# Patient Record
Sex: FEMALE | Race: BLACK OR AFRICAN AMERICAN | NOT HISPANIC OR LATINO | Employment: STUDENT | URBAN - METROPOLITAN AREA
[De-identification: names, ages, dates, MRNs, and addresses within clinical notes are randomized per-mention and may not be internally consistent; named-entity substitution may affect disease eponyms.]

---

## 2024-01-02 ENCOUNTER — TELEPHONE (OUTPATIENT)
Dept: URGENT CARE | Facility: CLINIC | Age: 24
End: 2024-01-02
Payer: COMMERCIAL

## 2024-01-02 DIAGNOSIS — N76.0 BV (BACTERIAL VAGINOSIS): Primary | ICD-10-CM

## 2024-01-02 DIAGNOSIS — B96.89 BV (BACTERIAL VAGINOSIS): Primary | ICD-10-CM

## 2024-01-02 RX ORDER — METRONIDAZOLE 500 MG/1
500 TABLET ORAL EVERY 12 HOURS
Qty: 14 TABLET | Refills: 0 | Status: SHIPPED | OUTPATIENT
Start: 2024-01-02 | End: 2024-01-09

## 2024-01-02 NOTE — TELEPHONE ENCOUNTER
Results reviewed patient is positive for BV sent over Flagyl advised on how to take and possible side effects. Patient advised not to drink alcohol with this medication we will follow up as needed

## 2024-03-18 DIAGNOSIS — S76.301A RIGHT HAMSTRING INJURY, INITIAL ENCOUNTER: ICD-10-CM

## 2024-03-18 DIAGNOSIS — S76.302A HAMSTRING INJURY, LEFT, INITIAL ENCOUNTER: Primary | ICD-10-CM

## 2024-03-27 ENCOUNTER — CLINICAL SUPPORT (OUTPATIENT)
Dept: REHABILITATION | Facility: HOSPITAL | Age: 24
End: 2024-03-27
Attending: STUDENT IN AN ORGANIZED HEALTH CARE EDUCATION/TRAINING PROGRAM
Payer: COMMERCIAL

## 2024-03-27 DIAGNOSIS — S76.301A RIGHT HAMSTRING INJURY, INITIAL ENCOUNTER: ICD-10-CM

## 2024-03-27 DIAGNOSIS — M79.605 PAIN IN BOTH LOWER EXTREMITIES: Primary | ICD-10-CM

## 2024-03-27 DIAGNOSIS — M79.604 PAIN IN BOTH LOWER EXTREMITIES: Primary | ICD-10-CM

## 2024-03-27 DIAGNOSIS — R53.1 WEAKNESS: ICD-10-CM

## 2024-03-27 DIAGNOSIS — S76.302A HAMSTRING INJURY, LEFT, INITIAL ENCOUNTER: ICD-10-CM

## 2024-03-27 PROCEDURE — 97110 THERAPEUTIC EXERCISES: CPT | Performed by: PHYSICAL THERAPIST

## 2024-03-27 PROCEDURE — 97161 PT EVAL LOW COMPLEX 20 MIN: CPT | Performed by: PHYSICAL THERAPIST

## 2024-03-27 NOTE — PROGRESS NOTES
[facility]   Physical Therapy Initial Evaluation      Name: Yahaira Darling  Clinic Number: [id number]    Therapy Diagnosis: No diagnosis found.     Physician: Mary Kate Monaco DO    Physician Orders:  eval and treat  Medical Diagnosis from Referral:   S76.302A (ICD-10-CM) - Hamstring injury, left, initial encounter   S76.301A (ICD-10-CM) - Right hamstring injury, initial encounter     Evaluation Date: 3/27/2024  Authorization Period Expiration: 3/18/24  Plan of Care Expiration: 12/31/24  Progress Note Due: 4/30/24  Visit # / Visits authorized: 1/ 1   FOTO: 1/1    Precautions: Standard     Time In: 1000  Time Out: 1100  Total Appointment Time (timed & untimed codes): 60 minutes    Subjective     Date of onset: chronic    History of current condition - Tea reports: 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 She c/o curtis posterior thigh and knee pain. She had had this pain off and on since high school and has not every full improved. Sprinter. States she is only able to run at about 70% due to pain. Denies N/T, radiating pain. Denies LBP, hip pain. Currently in season which ends in May.    Falls: none    Imaging: see chart:     Prior Therapy: with ATC at school  Social History:  lives with a friend  Occupation: student athlete  Prior Level of Function: independent, track with pain  Current Level of Function: limited ability secondary to pain    Pain:  Current 1/10, worst 8/10, best 0/10   Location: curtis hamstring L>R    Description: sharp, dull , ache  Aggravating Factors: running  Easing Factors: rest    Patients goals: run without pain     Medical History:   Past Medical History:   Diagnosis Date    ADHD (attention deficit hyperactivity disorder)     Allergy     Anxiety     Asthma     Depression     Psychiatric problem     Therapy        Surgical History:   Yahaira Darling  has a past surgical history that includes Examination under anesthesia (N/A, 8/4/2020) and Rotator cuff  repair.    Medications:   Tea has a current medication list which includes the following prescription(s): albuterol, azelastine, cetirizine, chlorhexidine, dextroamphetamine-amphetamine, dextroamphetamine-amphetamine, dextroamphetamine-amphetamine, fluticasone propionate, loratadine, mupirocin, norgestrel-ethinyl estradiol, and promethazine-dextromethorphan.    Allergies:   Review of patient's allergies indicates:  No Known Allergies     Objective      Observation: pt presents in gym. No distress noted. No bruising or deformity noted    Gait: independent, no significant deviations noted    Functional tests(*=pain):   DL squat: antalgic, non functional  SL squat: antalgic non functional  SLS EO: no LOB  SLS EC: no LOB  Eccentric step down: n/t  DL jump: n/t  SL hop: n/t    Range of Motion(*=pain):   Knee AROM PROM   Right 5-0-140 5-0-140   Left 5-0-140 5-0-140     Lower Extremity Strength: HHD lbs  Right LE  Left LE    Quadriceps: 43.0 Quadriceps: 35.2   Hamstrings:  30 deg    60 deg     90 deg   21.9 (5/10)    15.9 (2/20)    23.8 (5/10) Hamstrings:  30 deg    60 deg    90 deg   11.5 (8/10)    8.1 (7/10)    8.8 (7/10)     Special Tests:   Right Left   Valgus Stress Test N N   Varus Stress test N N   Lachman's test N N   Posterior Lachman N N   Rubina's Test N N   Thessaly's Test N N   Patellar Grind Test N N     Joint Mobility: no deviations noted     Palpation: tenderness to mid muscle belly of medial and lateral hamstring curtsi    Proximal/distal screen: full ankle ROM, mild decrease in hip IR RLE    Sensation: intact BLE    Flexibility:    Ely's test: R = night ; L = tight    Hamstrings: R = 10 deg ; L = 10 deg    Teri's test: R = n/t ; L = n/t   Nick test: R = tight ; L = tight      Limitation/Restriction for FOTO  Survey    Therapist reviewed FOTO scores for Yahaira Darling on 3/27/2024.   FOTO documents entered into [facility] - see Media section.    Limitation Score: see media         Treatment     Total  Treatment time (time-based codes) separate from Evaluation: 20 minutes      Yahaira received the treatments listed below:      therapeutic exercises to develop strength for 20 minutes including:  3 range hamstring isometrics      Patient Education and Home Exercises     Education provided:   - reviewed hep, timeline, in season management    Written Home Exercises Provided: yes. Exercises were reviewed and Yahaira was able to demonstrate them prior to the end of the session.  Yahaira demonstrated good  understanding of the education provided. See EMR under Patient Instructions for exercises provided during therapy sessions.    Assessment     Yahaira is a 23 y.o. female referred to outpatient Physical Therapy with a medical diagnosis of   S76.302A (ICD-10-CM) - Hamstring injury, left, initial encounter   S76.301A (ICD-10-CM) - Right hamstring injury, initial encounter   . Patient presents with bilateral hamstring pain/strain with acute on chronic nature, decreased force output , antalgic running, decreased functional mobility secondary to the above dx. Pt would benefit from skilled PT in order to maximize function. Informed pt that this will only resolve with rest and then reconditioning. She shows VU. Will manage in season and work in off season to decreased all symptoms and recondition the hamstrings.     Patient prognosis is Good.   Patient will benefit from skilled outpatient Physical Therapy to address the deficits stated above and in the chart below, provide patient /family education, and to maximize patientt's level of independence.     Plan of care discussed with patient: Yes  Patient's spiritual, cultural and educational needs considered and patient is agreeable to the plan of care and goals as stated below:     Anticipated Barriers for therapy: in season    Medical Necessity is demonstrated by the following  History  Co-morbidities and personal factors that may impact the plan of care [x] LOW: no personal factors /  co-morbidities  [] MODERATE: 1-2 personal factors / co-morbidities  [] HIGH: 3+ personal factors / co-morbidities     Moderate / High Support Documentation:   Co-morbidities affecting plan of care: NA     Personal Factors:   no deficits      Examination  Body Structures and Functions, activity limitations and participation restrictions that may impact the plan of care [x] LOW: addressing 1-2 elements  [] MODERATE: 3+ elements  [] HIGH: 4+ elements (please support below)     Moderate / High Support Documentation: NA      Clinical Presentation [x] LOW: stable  [] MODERATE: Evolving  [] HIGH: Unstable      Decision Making/ Complexity Score: low         Goals:  Short Term Goals: 6-8 weeks   Pt independent in hep  Pain 0-5/10, pt able to managed with help of ATC  Increased torque output by 50% of current  Pt completes season    Long Term Goals: 16-30 weeks   Pt independent in d/c hep  0/10 pain  Isometric torque 100 % LSI and in standard for her bodyweight  Isokinetic testing 100% LSI and in standard for her bodyweight  Pt progresses through strength and conditioning program  Pt returns to jogging and progresses through return to sprinting program.    Plan     Plan of care Certification: 3/27/2024 to 12/31/24.    Outpatient Physical Therapy 1-3 times weekly for 30 weeks to include the following interventions: Electrical Stimulation IFC, Manual Therapy, Moist Heat/ Ice, Neuromuscular Re-ed, Patient Education, Therapeutic Activities, and Therapeutic Exercise.     Adelso Hdez, PT

## 2024-03-29 NOTE — PLAN OF CARE
[facility]   Physical Therapy Initial Evaluation      Name: Yahaira Darling  Clinic Number: [id number]    Therapy Diagnosis: No diagnosis found.     Physician: Mary Kate Monaco DO    Physician Orders:  eval and treat  Medical Diagnosis from Referral:   S76.302A (ICD-10-CM) - Hamstring injury, left, initial encounter   S76.301A (ICD-10-CM) - Right hamstring injury, initial encounter     Evaluation Date: 3/27/2024  Authorization Period Expiration: 3/18/24  Plan of Care Expiration: 12/31/24  Progress Note Due: 4/30/24  Visit # / Visits authorized: 1/ 1   FOTO: 1/1    Precautions: Standard     Time In: 1000  Time Out: 1100  Total Appointment Time (timed & untimed codes): 60 minutes    Subjective     Date of onset: chronic    History of current condition - Tea reports: 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 She had had this pain off and on since high school and has not every full improved. Sprinter. States she is only able to run at about 70% due to pain. Denies N/T, radiating pain. Denies LBP, hip pain. Currently in season which ends in May.    Falls: none    Imaging: see chart:     Prior Therapy: with ATC at school  Social History:  lives with a friend  Occupation: student athlete  Prior Level of Function: independent, track with pain  Current Level of Function: limited ability secondary to pain    Pain:  Current 1/10, worst 8/10, best 0/10   Location: curtis hamstring L>R    Description: sharp, dull , ache  Aggravating Factors: running  Easing Factors: rest    Patients goals: run without pain     Medical History:   Past Medical History:   Diagnosis Date    ADHD (attention deficit hyperactivity disorder)     Allergy     Anxiety     Asthma     Depression     Psychiatric problem     Therapy        Surgical History:   Yahaira Darling  has a past surgical history that includes Examination under anesthesia (N/A, 8/4/2020) and Rotator cuff  repair.    Medications:   Tea has a current medication list which includes the following prescription(s): albuterol, azelastine, cetirizine, chlorhexidine, dextroamphetamine-amphetamine, dextroamphetamine-amphetamine, dextroamphetamine-amphetamine, fluticasone propionate, loratadine, mupirocin, norgestrel-ethinyl estradiol, and promethazine-dextromethorphan.    Allergies:   Review of patient's allergies indicates:  No Known Allergies     Objective      Observation: pt presents in gym. No distress noted. No bruising or deformity noted    Gait: independent, no significant deviations noted    Functional tests(*=pain):   DL squat: antalgic, non functional  SL squat: antalgic non functional  SLS EO: no LOB  SLS EC: no LOB  Eccentric step down: n/t  DL jump: n/t  SL hop: n/t    Range of Motion(*=pain):   Knee AROM PROM   Right 5-0-140 5-0-140   Left 5-0-140 5-0-140     Lower Extremity Strength: HHD lbs  Right LE  Left LE    Quadriceps: 43.0 Quadriceps: 35.2   Hamstrings:  30 deg    60 deg     90 deg   21.9 (5/10)    15.9 (2/20)    23.8 (5/10) Hamstrings:  30 deg    60 deg    90 deg   11.5 (8/10)    8.1 (7/10)    8.8 (7/10)     Special Tests:   Right Left   Valgus Stress Test N N   Varus Stress test N N   Lachman's test N N   Posterior Lachman N N   Rubina's Test N N   Thessaly's Test N N   Patellar Grind Test N N     Joint Mobility: no deviations noted     Palpation: tenderness to mid muscle belly of medial and lateral hamstring curtis    Proximal/distal screen: full ankle ROM, mild decrease in hip IR RLE    Sensation: intact BLE    Flexibility:    Ely's test: R = night ; L = tight    Hamstrings: R = 10 deg ; L = 10 deg    Teri's test: R = n/t ; L = n/t   Nick test: R = tight ; L = tight      Limitation/Restriction for FOTO  Survey    Therapist reviewed FOTO scores for Yahaira Darling on 3/27/2024.   FOTO documents entered into [facility] - see Media section.    Limitation Score: see media         Treatment     Total  Treatment time (time-based codes) separate from Evaluation: 20 minutes      Yahaira received the treatments listed below:      therapeutic exercises to develop strength for 20 minutes including:  3 range hamstring isometrics      Patient Education and Home Exercises     Education provided:   - reviewed hep, timeline, in season management    Written Home Exercises Provided: yes. Exercises were reviewed and Yahaira was able to demonstrate them prior to the end of the session.  Yahaira demonstrated good  understanding of the education provided. See EMR under Patient Instructions for exercises provided during therapy sessions.    Assessment     Yahaira is a 23 y.o. female referred to outpatient Physical Therapy with a medical diagnosis of   S76.302A (ICD-10-CM) - Hamstring injury, left, initial encounter   S76.301A (ICD-10-CM) - Right hamstring injury, initial encounter   . Patient presents with bilateral hamstring pain/strain with acute on chronic nature, decreased force output , antalgic running, decreased functional mobility secondary to the above dx. Pt would benefit from skilled PT in order to maximize function. Informed pt that this will only resolve with rest and then reconditioning. She shows VU. Will manage in season and work in off season to decreased all symptoms and recondition the hamstrings.     Patient prognosis is Good.   Patient will benefit from skilled outpatient Physical Therapy to address the deficits stated above and in the chart below, provide patient /family education, and to maximize patientt's level of independence.     Plan of care discussed with patient: Yes  Patient's spiritual, cultural and educational needs considered and patient is agreeable to the plan of care and goals as stated below:     Anticipated Barriers for therapy: in season    Medical Necessity is demonstrated by the following  History  Co-morbidities and personal factors that may impact the plan of care [x] LOW: no personal factors /  co-morbidities  [] MODERATE: 1-2 personal factors / co-morbidities  [] HIGH: 3+ personal factors / co-morbidities     Moderate / High Support Documentation:   Co-morbidities affecting plan of care: NA     Personal Factors:   no deficits      Examination  Body Structures and Functions, activity limitations and participation restrictions that may impact the plan of care [x] LOW: addressing 1-2 elements  [] MODERATE: 3+ elements  [] HIGH: 4+ elements (please support below)     Moderate / High Support Documentation: NA      Clinical Presentation [x] LOW: stable  [] MODERATE: Evolving  [] HIGH: Unstable      Decision Making/ Complexity Score: low         Goals:  Short Term Goals: 6-8 weeks   Pt independent in hep  Pain 0-5/10, pt able to managed with help of ATC  Increased torque output by 50% of current  Pt completes season    Long Term Goals: 16-30 weeks   Pt independent in d/c hep  0/10 pain  Isometric torque 100 % LSI and in standard for her bodyweight  Isokinetic testing 100% LSI and in standard for her bodyweight  Pt progresses through strength and conditioning program  Pt returns to jogging and progresses through return to sprinting program.    Plan     Plan of care Certification: 3/27/2024 to 12/31/24.    Outpatient Physical Therapy 1-3 times weekly for 30 weeks to include the following interventions: Electrical Stimulation IFC, Manual Therapy, Moist Heat/ Ice, Neuromuscular Re-ed, Patient Education, Therapeutic Activities, and Therapeutic Exercise.     Adelso Hdez, PT

## 2024-05-31 ENCOUNTER — HOSPITAL ENCOUNTER (EMERGENCY)
Facility: HOSPITAL | Age: 24
Discharge: LEFT BEFORE DISCHARGE | End: 2024-05-31
Attending: EMERGENCY MEDICINE
Payer: COMMERCIAL

## 2024-05-31 VITALS
WEIGHT: 145 LBS | HEART RATE: 60 BPM | SYSTOLIC BLOOD PRESSURE: 109 MMHG | HEIGHT: 65 IN | RESPIRATION RATE: 16 BRPM | OXYGEN SATURATION: 100 % | TEMPERATURE: 99 F | BODY MASS INDEX: 24.16 KG/M2 | DIASTOLIC BLOOD PRESSURE: 53 MMHG

## 2024-05-31 DIAGNOSIS — M25.512 LEFT SHOULDER PAIN: ICD-10-CM

## 2024-05-31 DIAGNOSIS — Z53.21 ELOPED FROM EMERGENCY DEPARTMENT: Primary | ICD-10-CM

## 2024-05-31 LAB
B-HCG UR QL: NEGATIVE
CTP QC/QA: YES

## 2024-05-31 PROCEDURE — 25000003 PHARM REV CODE 250: Performed by: PHYSICIAN ASSISTANT

## 2024-05-31 PROCEDURE — 81025 URINE PREGNANCY TEST: CPT

## 2024-05-31 PROCEDURE — 96372 THER/PROPH/DIAG INJ SC/IM: CPT | Performed by: PHYSICIAN ASSISTANT

## 2024-05-31 PROCEDURE — 99284 EMERGENCY DEPT VISIT MOD MDM: CPT | Mod: 25

## 2024-05-31 PROCEDURE — 63600175 PHARM REV CODE 636 W HCPCS: Performed by: PHYSICIAN ASSISTANT

## 2024-05-31 RX ORDER — KETOROLAC TROMETHAMINE 30 MG/ML
30 INJECTION, SOLUTION INTRAMUSCULAR; INTRAVENOUS
Status: COMPLETED | OUTPATIENT
Start: 2024-05-31 | End: 2024-05-31

## 2024-05-31 RX ORDER — ACETAMINOPHEN 500 MG
1000 TABLET ORAL
Status: COMPLETED | OUTPATIENT
Start: 2024-05-31 | End: 2024-05-31

## 2024-05-31 RX ADMIN — KETOROLAC TROMETHAMINE 30 MG: 30 INJECTION, SOLUTION INTRAMUSCULAR at 06:05

## 2024-05-31 RX ADMIN — ACETAMINOPHEN 1000 MG: 500 TABLET ORAL at 06:05

## 2024-05-31 NOTE — ED PROVIDER NOTES
"Encounter Date: 5/31/2024    SCRIBE #1 NOTE: I, Tenisha Rodriguez, am scribing for, and in the presence of,  Alayna Holdsworth, PA-C. I have scribed the following portions of the note - Other sections scribed: HPI, ROS.       History     Chief Complaint   Patient presents with    Shoulder Pain     Pt reports sleeping on her LEFT shoulder, woke from sleep with it feeling like it was dislocated this morning, and that it popped back into place in route to ED; pt has hx of LEFT shoulder dislocation in 2022; pt still c/o pain to left shoulder "soreness"     23-year-old female with no significant past medical history & and past surgical history of Rotator Cuff Repair, presents to the ED with Left Shoulder Pain, symptoms onset this morning. Patient reports sore, throbbing 7/10 shoulder pain. Patient states she woke from her sleep with her left shoulder feeling like it was dislocated and that en route to the hospital it felt like it popped back into place; has dislocated her shoulder at least three times in the past. Patient also states that she has had surgery on the right shoulder. Patient further states movement and touch worsens her pain. Patient denies numbness/paresthesia, swelling, or other associated symptoms. Patient did not attempt treatment/medication PTA. No other alleviating or exacerbating factors. This is the extent of the patient's complaints in the ED. NKDA.                         The history is provided by the patient. No  was used.     Review of patient's allergies indicates:  No Known Allergies  Past Medical History:   Diagnosis Date    ADHD (attention deficit hyperactivity disorder)     Allergy     Anxiety     Asthma     Depression     Psychiatric problem     Therapy      Past Surgical History:   Procedure Laterality Date    EXAMINATION UNDER ANESTHESIA N/A 8/4/2020    Procedure: EXAM UNDER ANESTHESIA;  Surgeon: Delfina Surgeon;  [facility];  Service: Anesthesiology;  Laterality: " N/A;    ROTATOR CUFF REPAIR       Family History   Problem Relation Name Age of Onset    Hypertension Mother      ADD / ADHD Sister      Hypertension Maternal Grandmother      Drug abuse Maternal Grandmother      Hypertension Maternal Grandfather      Drug abuse Maternal Grandfather      Hypertension Paternal Grandmother      Drug abuse Paternal Grandmother      Hypertension Paternal Grandfather      Drug abuse Paternal Grandfather      Breast cancer Neg Hx      Colon cancer Neg Hx      Ovarian cancer Neg Hx       Social History     Tobacco Use    Smoking status: Never    Smokeless tobacco: Never   Substance Use Topics    Alcohol use: Yes     Comment: socially    Drug use: Never     Review of Systems   Constitutional:  Negative for fever.   Musculoskeletal:  Positive for arthralgias (left shoulder). Negative for joint swelling.   Skin:  Negative for color change and rash.   Neurological:  Negative for weakness and numbness.        (-) paresthesia        Physical Exam     Initial Vitals [05/31/24 0557]   BP Pulse Resp Temp SpO2   (!) 109/53 60 16 98.7 °F (37.1 °C) 100 %      MAP       --         Physical Exam    Nursing note and vitals reviewed.  Constitutional: She appears well-developed and well-nourished. She is not diaphoretic. She is active. She does not appear ill. No distress.   HENT:   Head: Normocephalic and atraumatic.   Right Ear: External ear normal.   Left Ear: External ear normal.   Nose: Nose normal.   Eyes: Conjunctivae, EOM and lids are normal. Pupils are equal, round, and reactive to light. Right eye exhibits no discharge. Left eye exhibits no discharge.   Neck: Phonation normal. Neck supple.   Normal range of motion.   Full passive range of motion without pain.     Cardiovascular:            Pulses:       Radial pulses are 2+ on the left side.   Pulmonary/Chest: Effort normal. No respiratory distress.   Abdominal: She exhibits no distension.   Musculoskeletal:         General: Normal range of  motion.      Left shoulder: Tenderness (anterior lateral) present. No swelling, deformity, effusion, laceration or crepitus. Normal range of motion. Normal strength.      Left upper arm: Normal.      Cervical back: Full passive range of motion without pain, normal range of motion and neck supple.      Comments: Normal range of motion of the left shoulder with pain.  2+ radial pulse.  Normal sensation.  No deformities appreciated.  No erythema, warmth, swelling, or pallor appreciated.     Neurological: She is alert and oriented to person, place, and time. She has normal strength. No sensory deficit. GCS eye subscore is 4. GCS verbal subscore is 5. GCS motor subscore is 6.   Skin: Skin is dry. Capillary refill takes less than 2 seconds.         ED Course   Procedures  Labs Reviewed   POCT URINE PREGNANCY          Imaging Results              X-Ray Shoulder Trauma Left (In process)                      Medications   ketorolac injection 30 mg (30 mg Intramuscular Given 5/31/24 0612)   acetaminophen tablet 1,000 mg (1,000 mg Oral Given 5/31/24 0612)     Medical Decision Making  23-year-old female with no significant past medical history & and past surgical history of Rotator Cuff Repair, presents to the ED with Left Shoulder Pain, symptoms onset this morning.  Patient's chart and medical history reviewed.    Ddx:  Fracture  Dislocation  Contusion  Sprain    Patient's vitals reviewed.  Afebrile, no respiratory distress, and nontoxic-appearing in the ED. Normal range of motion of the left shoulder with pain.  2+ radial pulse.  Normal sensation.  No deformities appreciated.  No erythema, warmth, swelling, or pallor appreciated.  UPT was negative.  Patient given Toradol and Tylenol pain.  Left shoulder x-ray was unremarkable per my personal interpretation.  Patient states she no longer wants to wait for x-ray results.  Patient eloped from ED before signing AMA.    Amount and/or Complexity of Data Reviewed  Labs:  ordered.            Scribe Attestation:   Scribe #1: I performed the above scribed service and the documentation accurately describes the services I performed. I attest to the accuracy of the note.           I, Alayna Holdsworth,PA-C, personally performed the services described in this documentation. All medical record entries made by the scribe were at my direction and in my presence.  I have reviewed the chart and agree that the record reflects my personal performance and is accurate and complete.                      Clinical Impression:  Final diagnoses:  [M25.512] Left shoulder pain  [M25.512] Left shoulder pain - recent dislocation--spontaneous reduction  [Z53.21] Eloped from emergency department (Primary)          ED Disposition Condition    Eloped Stable                Holdsworth, Alayna, PA-C  05/31/24 0807

## 2024-05-31 NOTE — ED NOTES
"Pt wanting to leave AMA stating that "she is ready to go, been waiting til 6am". Pt does not want to wait for xray results. Pt refused to sign AMA form. DEANNA Arshad notified.   "

## 2024-05-31 NOTE — ED TRIAGE NOTES
"Pt arrives to ED via personal transportation with reports that she was sleeping on her LEFT shoulder, woke from sleep with it feeling like it was dislocated this morning, and that it popped back into place in route to ED; pt has hx of LEFT shoulder dislocation in 2022; pt still c/o pain to left shoulder "soreness" 7/10; no meds taken for pain; no obvious deformity noted; pt AAOx4  "

## 2024-06-01 ENCOUNTER — HOSPITAL ENCOUNTER (EMERGENCY)
Facility: HOSPITAL | Age: 24
Discharge: HOME | End: 2024-06-02
Attending: EMERGENCY MEDICINE
Payer: COMMERCIAL

## 2024-06-01 DIAGNOSIS — M25.519 SHOULDER PAIN, UNSPECIFIED CHRONICITY, UNSPECIFIED LATERALITY: ICD-10-CM

## 2024-06-01 DIAGNOSIS — R11.2 NAUSEA AND VOMITING, UNSPECIFIED VOMITING TYPE: Primary | ICD-10-CM

## 2024-06-01 DIAGNOSIS — Z76.0 MEDICATION REFILL: ICD-10-CM

## 2024-06-01 DIAGNOSIS — G47.00 INSOMNIA, UNSPECIFIED TYPE: ICD-10-CM

## 2024-06-01 LAB
B-HCG UR QL: NEGATIVE
BASOPHILS # BLD AUTO: 0.01 K/UL (ref 0–0.2)
BASOPHILS NFR BLD: 0.2 % (ref 0–1.9)
CTP QC/QA: YES
DIFFERENTIAL METHOD BLD: ABNORMAL
EOSINOPHIL # BLD AUTO: 0.1 K/UL (ref 0–0.5)
EOSINOPHIL NFR BLD: 1.7 % (ref 0–8)
ERYTHROCYTE [DISTWIDTH] IN BLOOD BY AUTOMATED COUNT: 12.6 % (ref 11.5–14.5)
HCT VFR BLD AUTO: 40.4 % (ref 37–48.5)
HGB BLD-MCNC: 13.8 G/DL (ref 12–16)
IMM GRANULOCYTES # BLD AUTO: 0.01 K/UL (ref 0–0.04)
IMM GRANULOCYTES NFR BLD AUTO: 0.2 % (ref 0–0.5)
LYMPHOCYTES # BLD AUTO: 1.7 K/UL (ref 1–4.8)
LYMPHOCYTES NFR BLD: 28.9 % (ref 18–48)
MCH RBC QN AUTO: 31.4 PG (ref 27–31)
MCHC RBC AUTO-ENTMCNC: 34.2 G/DL (ref 32–36)
MCV RBC AUTO: 92 FL (ref 82–98)
MONOCYTES # BLD AUTO: 0.4 K/UL (ref 0.3–1)
MONOCYTES NFR BLD: 6.3 % (ref 4–15)
NEUTROPHILS # BLD AUTO: 3.8 K/UL (ref 1.8–7.7)
NEUTROPHILS NFR BLD: 62.7 % (ref 38–73)
NRBC BLD-RTO: 0 /100 WBC
PLATELET # BLD AUTO: 193 K/UL (ref 150–450)
PMV BLD AUTO: 11.3 FL (ref 9.2–12.9)
RBC # BLD AUTO: 4.39 M/UL (ref 4–5.4)
WBC # BLD AUTO: 5.99 K/UL (ref 3.9–12.7)

## 2024-06-01 PROCEDURE — 81025 URINE PREGNANCY TEST: CPT | Performed by: EMERGENCY MEDICINE

## 2024-06-01 PROCEDURE — 83690 ASSAY OF LIPASE: CPT | Performed by: EMERGENCY MEDICINE

## 2024-06-01 PROCEDURE — 81000 URINALYSIS NONAUTO W/SCOPE: CPT | Mod: 59 | Performed by: EMERGENCY MEDICINE

## 2024-06-01 PROCEDURE — 99283 EMERGENCY DEPT VISIT LOW MDM: CPT

## 2024-06-01 PROCEDURE — 80307 DRUG TEST PRSMV CHEM ANLYZR: CPT | Performed by: EMERGENCY MEDICINE

## 2024-06-01 PROCEDURE — 80053 COMPREHEN METABOLIC PANEL: CPT | Performed by: EMERGENCY MEDICINE

## 2024-06-01 PROCEDURE — 87591 N.GONORRHOEAE DNA AMP PROB: CPT | Performed by: PHYSICIAN ASSISTANT

## 2024-06-01 PROCEDURE — 85025 COMPLETE CBC W/AUTO DIFF WBC: CPT | Performed by: EMERGENCY MEDICINE

## 2024-06-01 PROCEDURE — 87086 URINE CULTURE/COLONY COUNT: CPT | Performed by: PHYSICIAN ASSISTANT

## 2024-06-01 PROCEDURE — 25000003 PHARM REV CODE 250: Performed by: EMERGENCY MEDICINE

## 2024-06-01 RX ORDER — ONDANSETRON 4 MG/1
4 TABLET, ORALLY DISINTEGRATING ORAL
Status: COMPLETED | OUTPATIENT
Start: 2024-06-01 | End: 2024-06-01

## 2024-06-01 RX ADMIN — ONDANSETRON 4 MG: 4 TABLET, ORALLY DISINTEGRATING ORAL at 11:06

## 2024-06-02 VITALS
WEIGHT: 145 LBS | DIASTOLIC BLOOD PRESSURE: 74 MMHG | BODY MASS INDEX: 24.16 KG/M2 | HEART RATE: 60 BPM | TEMPERATURE: 99 F | RESPIRATION RATE: 18 BRPM | HEIGHT: 65 IN | SYSTOLIC BLOOD PRESSURE: 104 MMHG | OXYGEN SATURATION: 99 %

## 2024-06-02 LAB
ALBUMIN SERPL BCP-MCNC: 4.4 G/DL (ref 3.5–5.2)
ALP SERPL-CCNC: 45 U/L (ref 55–135)
ALT SERPL W/O P-5'-P-CCNC: 8 U/L (ref 10–44)
AMPHET+METHAMPHET UR QL: NEGATIVE
ANION GAP SERPL CALC-SCNC: 12 MMOL/L (ref 8–16)
AST SERPL-CCNC: 25 U/L (ref 10–40)
BACTERIA #/AREA URNS HPF: ABNORMAL /HPF
BARBITURATES UR QL SCN>200 NG/ML: NEGATIVE
BENZODIAZ UR QL SCN>200 NG/ML: NEGATIVE
BILIRUB SERPL-MCNC: 1.2 MG/DL (ref 0.1–1)
BILIRUB UR QL STRIP: NEGATIVE
BUN SERPL-MCNC: 13 MG/DL (ref 6–20)
BZE UR QL SCN: NEGATIVE
CALCIUM SERPL-MCNC: 9.6 MG/DL (ref 8.7–10.5)
CANNABINOIDS UR QL SCN: ABNORMAL
CHLORIDE SERPL-SCNC: 104 MMOL/L (ref 95–110)
CLARITY UR: CLEAR
CO2 SERPL-SCNC: 22 MMOL/L (ref 23–29)
COLOR UR: YELLOW
CREAT SERPL-MCNC: 1.2 MG/DL (ref 0.5–1.4)
CREAT UR-MCNC: 163.4 MG/DL (ref 15–325)
EST. GFR  (NO RACE VARIABLE): >60 ML/MIN/1.73 M^2
GLUCOSE SERPL-MCNC: 85 MG/DL (ref 70–110)
GLUCOSE UR QL STRIP: NEGATIVE
HGB UR QL STRIP: ABNORMAL
KETONES UR QL STRIP: ABNORMAL
LEUKOCYTE ESTERASE UR QL STRIP: ABNORMAL
LIPASE SERPL-CCNC: 23 U/L (ref 4–60)
METHADONE UR QL SCN>300 NG/ML: NEGATIVE
MICROSCOPIC COMMENT: ABNORMAL
NITRITE UR QL STRIP: NEGATIVE
OPIATES UR QL SCN: NEGATIVE
PCP UR QL SCN>25 NG/ML: NEGATIVE
PH UR STRIP: 6 [PH] (ref 5–8)
POTASSIUM SERPL-SCNC: 4.1 MMOL/L (ref 3.5–5.1)
PROT SERPL-MCNC: 8 G/DL (ref 6–8.4)
PROT UR QL STRIP: ABNORMAL
RBC #/AREA URNS HPF: 4 /HPF (ref 0–4)
SODIUM SERPL-SCNC: 138 MMOL/L (ref 136–145)
SP GR UR STRIP: 1.01 (ref 1–1.03)
SQUAMOUS #/AREA URNS HPF: 2 /HPF
TOXICOLOGY INFORMATION: ABNORMAL
URN SPEC COLLECT METH UR: ABNORMAL
UROBILINOGEN UR STRIP-ACNC: NEGATIVE EU/DL
WBC #/AREA URNS HPF: 7 /HPF (ref 0–5)

## 2024-06-02 RX ORDER — FAMOTIDINE 20 MG/1
20 TABLET, FILM COATED ORAL 2 TIMES DAILY
Qty: 28 TABLET | Refills: 0 | Status: SHIPPED | OUTPATIENT
Start: 2024-06-02 | End: 2024-06-16

## 2024-06-02 RX ORDER — HYDROXYZINE PAMOATE 25 MG/1
25 CAPSULE ORAL 4 TIMES DAILY PRN
Qty: 20 CAPSULE | Refills: 0 | Status: SHIPPED | OUTPATIENT
Start: 2024-06-02

## 2024-06-02 RX ORDER — ONDANSETRON 4 MG/1
4 TABLET, ORALLY DISINTEGRATING ORAL EVERY 6 HOURS PRN
Qty: 20 TABLET | Refills: 0 | Status: SHIPPED | OUTPATIENT
Start: 2024-06-02

## 2024-06-02 RX ORDER — PROMETHAZINE HYDROCHLORIDE 12.5 MG/1
12.5 TABLET ORAL 4 TIMES DAILY PRN
Qty: 12 TABLET | Refills: 0 | Status: SHIPPED | OUTPATIENT
Start: 2024-06-02

## 2024-06-02 NOTE — ED PROVIDER NOTES
"Encounter Date: 6/1/2024       History     Chief Complaint   Patient presents with    Medication Refill     Pt was seen here yesterday morning after possible left shoulder dislocation but states she left (eloped) before getting her discharge paperwork or pain medication prescription     23 year old female with past history of asthma, ADHD, anxiety, depression presents with multiple complaints.    Patient was seen yesterday after reported shoulder dislocation which she states she " popped back in."  She eloped from the ED prior to x-ray read being done or discharge paperwork /Rx being given.  She states she still has some mild soreness to the left arm.  No numbness or weakness.  She states she has had multiple shoulder dislocations in the past and received a rotator cuff surgery to her right arm.  No repeat injury since yesterday's x-ray.     Patient reports her main complaint that brought her to the ED today is that she has had nausea and slight vomiting most days.  She states it has been ongoing for the last 3 weeks.  She has had prior to that in 2021 she was evaluated for this at which point they told her this was secondary to depression.  She states she thought she was pregnant but her pregnancy test was negative yesterday.  She states that smells make her gag as well as her taste buds have changed and she was no longer able to see the food she wants was.  Her last bowel movement was 2 hours ago and was mild diarrhea.  She states has a for his episode of diarrhea she has had.  She reports chronic urinary frequency and urgency, no dysuria or hematuria.  Her last menstrual period started 3 days ago and she states she is currently finishing it at this time.  Denies any fever.  She reports cramping periumbilical abdominal pain intermittently.  None currently.    Patient denies allergies.  She reports taking Claritin, albuterol, intermittent Adderall.  She does not take any medications for depression or " anxiety.    She reports last alcohol use was on Memorial day.  Denies tobacco or illicit drugs.        Review of patient's allergies indicates:  No Known Allergies  Past Medical History:   Diagnosis Date    ADHD (attention deficit hyperactivity disorder)     Allergy     Anxiety     Asthma     Depression     Psychiatric problem     Therapy      Past Surgical History:   Procedure Laterality Date    EXAMINATION UNDER ANESTHESIA N/A 8/4/2020    Procedure: EXAM UNDER ANESTHESIA;  Surgeon: Delfina Surgeon;  Location: [facility];  Service: Anesthesiology;  Laterality: N/A;    ROTATOR CUFF REPAIR       Family History   Problem Relation Name Age of Onset    Hypertension Mother      ADD / ADHD Sister      Hypertension Maternal Grandmother      Drug abuse Maternal Grandmother      Hypertension Maternal Grandfather      Drug abuse Maternal Grandfather      Hypertension Paternal Grandmother      Drug abuse Paternal Grandmother      Hypertension Paternal Grandfather      Drug abuse Paternal Grandfather      Breast cancer Neg Hx      Colon cancer Neg Hx      Ovarian cancer Neg Hx       Social History     Tobacco Use    Smoking status: Never    Smokeless tobacco: Never   Substance Use Topics    Alcohol use: Yes     Comment: socially    Drug use: Never     Review of Systems   Constitutional:  Negative for chills, diaphoresis and fever.   Eyes:  Negative for photophobia and visual disturbance.   Respiratory:  Negative for cough and shortness of breath.    Cardiovascular:  Negative for chest pain and leg swelling.   Gastrointestinal:  Positive for abdominal pain, diarrhea, nausea and vomiting. Negative for blood in stool and constipation.   Genitourinary:  Negative for dysuria, frequency, hematuria and urgency.   Musculoskeletal:  Positive for arthralgias. Negative for neck pain and neck stiffness.   Skin:  Negative for rash and wound.   Neurological:  Negative for weakness, light-headedness, numbness and headaches.    Psychiatric/Behavioral:  Negative for confusion and suicidal ideas.        Physical Exam     Initial Vitals [06/01/24 2228]   BP Pulse Resp Temp SpO2   107/65 82 19 98.3 °F (36.8 °C) 99 %      MAP       --         Physical Exam    Nursing note and vitals reviewed.  Constitutional: She appears well-developed and well-nourished. She is not diaphoretic. No distress.     No distress   HENT:   Head: Normocephalic and atraumatic.   Mouth/Throat: Oropharynx is clear and moist. No oropharyngeal exudate.    Moist mucous membranes   Eyes: Conjunctivae and EOM are normal. Pupils are equal, round, and reactive to light. Right eye exhibits no discharge. Left eye exhibits no discharge.   Neck: Neck supple. No JVD present.   Normal range of motion.  Cardiovascular:  Normal rate, regular rhythm, normal heart sounds and intact distal pulses.     Exam reveals no gallop and no friction rub.       No murmur heard.  Pulmonary/Chest: Breath sounds normal. No respiratory distress. She has no wheezes. She has no rhonchi. She has no rales.   Abdominal: Abdomen is soft. Bowel sounds are normal. She exhibits no distension. There is no abdominal tenderness.   Very mild periumbilical abdominal tenderness.  No CVA tenderness negative Ann sign. There is no rebound and no guarding.   Musculoskeletal:         General: No tenderness or edema.      Cervical back: Normal range of motion and neck supple.      Comments:   Full range of motion of her left shoulder she reports mild pain to the deltoid.  This is also where she received her Toradol shot.  I do not see any skin changes or infection.  2+ distal pulses.  Sensation intact in all distributions of the arm including deltoid.     Lymphadenopathy:     She has no cervical adenopathy.   Neurological: She is alert and oriented to person, place, and time. She has normal strength. No cranial nerve deficit or sensory deficit. GCS score is 15. GCS eye subscore is 4. GCS verbal subscore is 5. GCS motor  subscore is 6.   Skin: Skin is warm and dry. Capillary refill takes less than 2 seconds.   Psychiatric: She has a normal mood and affect. Thought content normal.         ED Course   Procedures  Labs Reviewed   CBC W/ AUTO DIFFERENTIAL - Abnormal; Notable for the following components:       Result Value    MCH 31.4 (*)     All other components within normal limits   COMPREHENSIVE METABOLIC PANEL - Abnormal; Notable for the following components:    CO2 22 (*)     Total Bilirubin 1.2 (*)     Alkaline Phosphatase 45 (*)     ALT 8 (*)     All other components within normal limits   URINALYSIS, REFLEX TO URINE CULTURE - Abnormal; Notable for the following components:    Protein, UA Trace (*)     Ketones, UA 2+ (*)     Occult Blood UA 2+ (*)     Leukocytes, UA 1+ (*)     All other components within normal limits    Narrative:     Specimen Source->Urine   DRUG SCREEN PANEL, URINE EMERGENCY - Abnormal; Notable for the following components:    THC Presumptive Positive (*)     All other components within normal limits    Narrative:     Specimen Source->Urine   URINALYSIS MICROSCOPIC - Abnormal; Notable for the following components:    WBC, UA 7 (*)     All other components within normal limits    Narrative:     Specimen Source->Urine   CULTURE, URINE   LIPASE   C. TRACHOMATIS/N. GONORRHOEAE BY AMP DNA   POCT URINE PREGNANCY          Imaging Results    None          Medications   ondansetron disintegrating tablet 4 mg (4 mg Oral Given 6/1/24 4939)     Medical Decision Making  Amount and/or Complexity of Data Reviewed  Labs: ordered.    Risk  Prescription drug management.    MDM    23-year-old female presents with multiple complaints.  Patient is requesting a medication refill as she eloped from the ED prior to getting her discharge paperwork yesterday after a reported shoulder dislocation  prior to ED evaluation.  Her x-ray at that time did not show any dislocation of her shoulder. She states she was still having some mild  soreness to the left shoulder and is requesting pain medication refilled.     Patient also reports  acute on chronic nausea and vomiting as well as cramping periumbilical abdominal pain.  Patient states she initially had this chronically back in 2021 and then it improved and she was able to gain 5 lb back however over the last 3 weeks she has had it again.  She reports food makes her vomit.  She reports sensitivity to certain smells and that her taste buds have changed.  She would 1 episode of diarrhea today.  Reports urinary frequency and urgency but this seems chronic.      Differential diagnosis includes was not limited to strain, sprain, drug seeking, gastroenteritis, metabolic derangement, pregnancy, urinary tract infection, cholecystitis, pancreatitis, depression, anxiety, cannabinoid hyperemesis.      We will obtain labs, urine, urine tox, UPT.  We will give Zofran ODT and if UPT negative we will give naproxen for shoulder pain.      Anticipate discharge home with Zofran, naproxen, Robaxin and follow up with primary care doctor to discuss chronic N.      Care signed out to Flakito at 11 pm pending results and re-evaluation.                ED Course as of 06/02/24 0103   Sun Jun 02, 2024   0055 Feels better on re-evaluation.  Still with mild nausea.  Initial exam with mild periumbilical tenderness.  Negative Ann sign.  Does admit to preprandial nausea vomiting; no real prandial or postprandial pain.  Slightly elevated total bilirubin.  Normal AST ALT alk-phos.  Discussed possibility of gallbladder colic.  Repeat abdominal exam without any tenderness following medications given in the ED.  Again with negative Ann sign.  No epigastric tenderness.  After discussion, strongly encouraged establishing care with a local primary care provider.  Referral placed.  Advised to try to discontinue marijuana use as this may also be related to cannabinoid hyperemesis.  Possible UTI via urinalysis, denies any urinary  symptoms.  No flank pain.  Reassuring vitals.  Otherwise healthy.  After discussion, we have elected to await culture rather than empirically treat at this time.  GC pending as well.  Will discharge with Pepcid b.i.d., advised GERD diet, advised vigorous p.o. hydration, p.r.n. antiemetics, discussed interim red flags and reasons for return.  At this time, I have low suspicion for emergent process or surgical abdomen. [SM]      ED Course User Index  [SM] Flakito Evans PA-C                           Clinical Impression:  Final diagnoses:  [Z76.0] Medication refill  [M25.519] Shoulder pain, unspecified chronicity, unspecified laterality  [R11.2] Nausea and vomiting, unspecified vomiting type (Primary)  [G47.00] Insomnia, unspecified type          ED Disposition Condition    Discharge Stable          ED Prescriptions       Medication Sig Dispense Start Date End Date Auth. Provider    ondansetron (ZOFRAN-ODT) 4 MG TbDL Take 1 tablet (4 mg total) by mouth every 6 (six) hours as needed (nausea). 20 tablet 6/2/2024 -- Flakito Evans PA-C    promethazine (PHENERGAN) 12.5 MG Tab Take 1 tablet (12.5 mg total) by mouth 4 (four) times daily as needed (severe nausea/vomiting). 12 tablet 6/2/2024 -- Flakito Evans PA-C    famotidine (PEPCID) 20 MG tablet Take 1 tablet (20 mg total) by mouth 2 (two) times daily. for 14 days 28 tablet 6/2/2024 6/16/2024 Flakito Evans PA-C    hydrOXYzine pamoate (VISTARIL) 25 MG Cap Take 1 capsule (25 mg total) by mouth 4 (four) times daily as needed (anxiety). 20 capsule 6/2/2024 -- Flakito Evans PA-C          Follow-up Information       Follow up With Specialties Details Why Contact [facility] - Emergency Dept Emergency Medicine  As needed, If symptoms worsen [address] [facility] [place] [address] [phone]    [place] -  Schedule an appointment as soon as possible for a visit in 2 days  to discuss recent ED visit, to establish primary doctor [address]  [place] [id number]  [phone]               Yoly Gorman MD  06/01/24 9057       Flakito Evans PAAlidaC  06/02/24 0100

## 2024-06-02 NOTE — ED TRIAGE NOTES
Pt presents to ED due to not being able to get medications scripts. Pt was seen here and left before getting prescriptions for pain medication. Pt also reports n/v that has been ongoing for x2 years.

## 2024-06-02 NOTE — DISCHARGE INSTRUCTIONS
Do the following to improve pain and swelling:     Rest. Limit the use of the injured body part. This helps prevent further damage to the body part and gives it time to heal. In some cases, you may need a sling, brace, splint, or cast to help keep the body part still until it has healed.   Ice. Applying ice right after an injury helps relieve pain and swelling. Wrap a bag of ice in a thin towel. (Frozen peas also works well.) Then, place it over the injured area. Do this for 10 to 15 minutes every 3 to 4 hours. Continue for the next 1 to 2 days or until your symptoms improve. Never put ice directly on your skin or ice an area longer than 15 minutes at a time.   Compression. Putting pressure on an injury helps reduce swelling and provides support. Wrap the injured area firmly with an elastic bandage (ACE wrap). Make sure not to wrap the bandage too tightly or you will cut off blood flow to the injured area. If your bandage loosens, rewrap it. Do not wear an elastic bandage overnight.   Elevation. Keeping an injury raised above the level of your heart reduces swelling, pain, and throbbing. For instance, if you have a broken leg, it may help to rest your leg on several pillows when sitting or lying down.       Return for any fever, inability to keep water down, worsening abdominal pain or any other concerns.     Thank you for coming to our Emergency Department today. As we discussed, it is important to remember that some problems are difficult to diagnose and may not be found during your Emergency Department visit. Be sure to follow up with your primary care doctor and review all labs/imaging/tests that were performed during this visit with them. Some labs/tests may be outside of the normal range and require non-emergent follow-up and further investigation to help diagnose/exclude/prevent complications or other medical conditions.    If you do not have a primary care doctor, you may contact the one listed on your  discharge paperwork or you may also call the [facility] Appointment Desk at [phone] to schedule an appointment and establish care with one. It is important to your health that you have a primary care doctor.    Please take all medications as directed. All medications may potentially have side-effects and it is impossible to predict which medications may give you side-effects or what side-effects (if any) they will give you.. If you feel that you are having a negative effect or side-effect of any medication you should immediately stop taking them and seek medical attention. If you feel that you are having a life-threatening reaction call 911.    Return to the ER with any questions/concerns, new/concerning symptoms, worsening or failure to improve.     Do not drive, swim, climb to height, take a bath or make any important decisions for 24 hours if you have received any pain medications, sedatives or mood altering drugs during your ER visit.      Follow-up with your previous psychiatrist for evaluation of insomnia, to see if any other recommendations or treatments may help you with your anxiety and difficulty sleeping.  You can also try Vistaril at night to see if any improvement of sleeping.  Strict GERD diet.  Pepcid twice daily.  Zofran as needed for nausea.  Phenergan for severe or persistent nausea vomiting. Be aware, this medication is sedating.  Do not mix with alcohol or any other sedating medications.  Do not drive or operate machinery when taking this medication.  Follow up and establish care with a local primary care provider for re-evaluation further recommendations.    Return to this ED if no improvement with current treatment plan, if you continue with vomiting, if unable to eat or drink, if worsening abdominal pain, if any other problems occur.

## 2024-06-03 LAB
C TRACH DNA SPEC QL NAA+PROBE: NOT DETECTED
N GONORRHOEA DNA SPEC QL NAA+PROBE: NOT DETECTED

## 2024-06-04 LAB — BACTERIA UR CULT: NORMAL

## 2024-06-07 ENCOUNTER — OFFICE VISIT (OUTPATIENT)
Dept: FAMILY MEDICINE | Facility: CLINIC | Age: 24
End: 2024-06-07
Payer: COMMERCIAL

## 2024-06-07 VITALS
DIASTOLIC BLOOD PRESSURE: 62 MMHG | HEIGHT: 65 IN | OXYGEN SATURATION: 99 % | WEIGHT: 136.69 LBS | SYSTOLIC BLOOD PRESSURE: 104 MMHG | TEMPERATURE: 98 F | BODY MASS INDEX: 22.77 KG/M2 | HEART RATE: 54 BPM

## 2024-06-07 DIAGNOSIS — R19.7 DIARRHEA, UNSPECIFIED TYPE: ICD-10-CM

## 2024-06-07 DIAGNOSIS — R11.2 NAUSEA AND VOMITING, UNSPECIFIED VOMITING TYPE: ICD-10-CM

## 2024-06-07 DIAGNOSIS — F41.0 GENERALIZED ANXIETY DISORDER WITH PANIC ATTACKS: ICD-10-CM

## 2024-06-07 DIAGNOSIS — Z00.00 ANNUAL PHYSICAL EXAM: Primary | ICD-10-CM

## 2024-06-07 DIAGNOSIS — F32.4 MAJOR DEPRESSIVE DISORDER, SINGLE EPISODE, IN PARTIAL REMISSION: ICD-10-CM

## 2024-06-07 DIAGNOSIS — M19.012 ARTHRITIS OF LEFT SHOULDER REGION: ICD-10-CM

## 2024-06-07 DIAGNOSIS — F41.1 GENERALIZED ANXIETY DISORDER WITH PANIC ATTACKS: ICD-10-CM

## 2024-06-07 PROCEDURE — 1159F MED LIST DOCD IN RCRD: CPT | Mod: CPTII,S$GLB,, | Performed by: FAMILY MEDICINE

## 2024-06-07 PROCEDURE — 3008F BODY MASS INDEX DOCD: CPT | Mod: CPTII,S$GLB,, | Performed by: FAMILY MEDICINE

## 2024-06-07 PROCEDURE — 3078F DIAST BP <80 MM HG: CPT | Mod: CPTII,S$GLB,, | Performed by: FAMILY MEDICINE

## 2024-06-07 PROCEDURE — 3074F SYST BP LT 130 MM HG: CPT | Mod: CPTII,S$GLB,, | Performed by: FAMILY MEDICINE

## 2024-06-07 PROCEDURE — 1160F RVW MEDS BY RX/DR IN RCRD: CPT | Mod: CPTII,S$GLB,, | Performed by: FAMILY MEDICINE

## 2024-06-07 PROCEDURE — 99999 PR PBB SHADOW E&M-EST. PATIENT-LVL V: CPT | Mod: PBBFAC,,, | Performed by: FAMILY MEDICINE

## 2024-06-07 PROCEDURE — 99213 OFFICE O/P EST LOW 20 MIN: CPT | Mod: 25,S$GLB,, | Performed by: FAMILY MEDICINE

## 2024-06-07 PROCEDURE — 99395 PREV VISIT EST AGE 18-39: CPT | Mod: S$GLB,,, | Performed by: FAMILY MEDICINE

## 2024-06-07 NOTE — PROGRESS NOTES
"Routine Office Visit    Yahaira Darling  2000  [id number]      Subjective     Yahaira West is a 23 y.o. female who presents today for:    Annual exam / establish care / new to me   Modified visit - topics discussed outside of annual physical    Shoulder pain - chronic condition for patient - Patient is an athlete/runner. She sometimes does high jumps, long runs, etc.  She has history of shoulder injury on right requiring rotator cuff repair. She now has similar pain on left shoulder.   Nausea, vomiting and diarrhea - Patient recently went to ER for evaluation. She was given antiemetic with relief of symptoms. She has stopped smoking THC and denies any improvement. She reports she is in school for psychiatry and was stressed. She feels diet and stress could be cause of symptoms. 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   HENT:  Negative for congestion.    Eyes:  Negative for blurred vision.   Respiratory:  Negative for cough.    Cardiovascular:  Negative for chest pain.   Gastrointestinal:  Positive for diarrhea, nausea and vomiting. Negative for abdominal pain, constipation and heartburn.   Genitourinary:  Negative for dysuria.   Musculoskeletal:  Positive for myalgias.   Skin:  Negative for itching and rash.   Neurological:  Negative for dizziness and headaches.   Psychiatric/Behavioral:  Negative for depression.      /62   Pulse (!) 54   Temp 98 °F (36.7 °C) (Oral)   Ht 5' 5" (1.651 m)   Wt 62 kg (136 lb 11 oz)   LMP 05/31/2024 (Exact Date)   SpO2 99%   BMI 22.75 kg/m²   Physical Exam  Constitutional:       Appearance: She is well-developed.   HENT:      Head: Normocephalic and atraumatic.   Eyes:      Conjunctiva/sclera: Conjunctivae normal.      Pupils: Pupils are equal, round, and reactive to light.   Cardiovascular:      Rate and Rhythm: Normal rate and regular " rhythm.      Heart sounds: Normal heart sounds. No murmur heard.     No friction rub. No gallop.   Pulmonary:      Effort: No respiratory distress.      Breath sounds: Normal breath sounds.   Abdominal:      General: Bowel sounds are normal. There is no distension.      Palpations: Abdomen is soft.      Tenderness: There is no abdominal tenderness.   Musculoskeletal:         General: Normal range of motion.      Cervical back: Normal range of motion and neck supple.   Lymphadenopathy:      Cervical: No cervical adenopathy.   Skin:     General: Skin is warm.   Neurological:      Mental Status: She is alert and oriented to person, place, and time.             Assessment     Health Maintenance         Date Due Completion Date    Hepatitis C Screening Never done ---    Lipid Panel Never done ---    HIV Screening Never done ---    Hepatitis A Vaccines (2 of 2 - 2-dose series) 04/17/2020 10/17/2019    COVID-19 Vaccine (4 - 2023-24 season) 09/01/2023 2/10/2022    Influenza Vaccine (Season Ended) 09/01/2024 10/27/2021    Chlamydia Screening 06/01/2025 6/1/2024    Pap Smear 08/24/2026 8/24/2023    TETANUS VACCINE 03/17/2033 3/17/2023    DTaP/Tdap/Td Vaccines (8 - Td or Tdap) 03/17/2033 3/17/2023              Problem List Items Addressed This Visit          Psychiatric    Generalized anxiety disorder with panic attacks    Overview     Anxiety started within the past year (4763-9512).  Panic attacks started after shoulder surgery in July, has had around 4 total.  Last panic attack was Sep. 2021.         Major depressive disorder, single episode, in partial remission    Overview     Started around fall 2020 after multiple stressful events.  Advised to f/u with psychiatry for depression / anxiety / ADHD           Other Visit Diagnoses       Annual physical exam    -  Primary    Relevant Orders    CBC Auto Differential    Comprehensive Metabolic Panel    Lipid Panel    Hemoglobin A1C    TSH  I addressed all major concerns as it  related to health maintenance.  All were ordered and scheduled based on the patients wishes.  Any additional health maintenance will be readdressed at the next physical if declined or deferred by the patient.         Modified visit - topics discussed outside of annual physical    ROS: shoulder pain, nausea, vomiting  PE: as above         Nausea and vomiting, unspecified vomiting type        Relevant Orders    Ambulatory referral/consult to Gastroenterology  Advise to stop smoking THC   Advise Patient may have symptoms of IBS; recommend change in diet   Decrease processed foods, increase whole foods   Avoid alcohol       Diarrhea, unspecified type        Relevant Orders    Ambulatory referral/consult to Gastroenterology    Arthritis of left shoulder region        Relevant Orders    Ambulatory referral/consult to Orthopedics                  Follow up in about 1 year (around 6/7/2025), or if symptoms worsen or fail to improve, for yearly exam.

## 2024-06-14 ENCOUNTER — TELEPHONE (OUTPATIENT)
Dept: ENDOSCOPY | Facility: HOSPITAL | Age: 24
End: 2024-06-14
Payer: COMMERCIAL

## 2024-06-14 ENCOUNTER — OFFICE VISIT (OUTPATIENT)
Dept: GASTROENTEROLOGY | Facility: CLINIC | Age: 24
End: 2024-06-14
Payer: COMMERCIAL

## 2024-06-14 VITALS
WEIGHT: 143 LBS | SYSTOLIC BLOOD PRESSURE: 103 MMHG | BODY MASS INDEX: 23.82 KG/M2 | HEIGHT: 65 IN | WEIGHT: 144.38 LBS | HEART RATE: 65 BPM | DIASTOLIC BLOOD PRESSURE: 64 MMHG | HEIGHT: 65 IN | BODY MASS INDEX: 24.06 KG/M2

## 2024-06-14 DIAGNOSIS — R11.2 NAUSEA AND VOMITING, UNSPECIFIED VOMITING TYPE: ICD-10-CM

## 2024-06-14 DIAGNOSIS — R11.2 NAUSEA AND VOMITING, UNSPECIFIED VOMITING TYPE: Primary | ICD-10-CM

## 2024-06-14 DIAGNOSIS — R19.7 DIARRHEA, UNSPECIFIED TYPE: ICD-10-CM

## 2024-06-14 PROCEDURE — 3078F DIAST BP <80 MM HG: CPT | Mod: CPTII,S$GLB,, | Performed by: INTERNAL MEDICINE

## 2024-06-14 PROCEDURE — 1159F MED LIST DOCD IN RCRD: CPT | Mod: CPTII,S$GLB,, | Performed by: INTERNAL MEDICINE

## 2024-06-14 PROCEDURE — 3008F BODY MASS INDEX DOCD: CPT | Mod: CPTII,S$GLB,, | Performed by: INTERNAL MEDICINE

## 2024-06-14 PROCEDURE — 3074F SYST BP LT 130 MM HG: CPT | Mod: CPTII,S$GLB,, | Performed by: INTERNAL MEDICINE

## 2024-06-14 PROCEDURE — 99204 OFFICE O/P NEW MOD 45 MIN: CPT | Mod: S$GLB,,, | Performed by: INTERNAL MEDICINE

## 2024-06-14 PROCEDURE — 99999 PR PBB SHADOW E&M-EST. PATIENT-LVL IV: CPT | Mod: PBBFAC,,, | Performed by: INTERNAL MEDICINE

## 2024-06-14 NOTE — PATIENT INSTRUCTIONS
Cannabinoid Hyperemesis Syndrome - [email or web address]    DISCUSSION  Marijuana is a popular recreational drug despite its  illicit status. Prevalence of marijuana use in the [place] is  4% and has significantly increased among adults. Marijuana  abuse and dependency significantly increased in  young black and  men (1). However, less than  30 cases of Cannabinoid Hyperemesis Syndrome have  been reported in the literature, only two of which are  from the [place] We suspect that Cannabinoid Hyperemesis  Syndrome is much more common than currently  recognized, and a study is currently underway at  our institution to estimate this prevalence.  Tetrahydrocannabinol (THC) is the major psychoactive  ingredient in marijuana and acts on endogenous  cannabinoid receptors CB1 and CB2. CB2 receptors are  expressed in various immune cells and their biological  role is currently being characterized. CB1 receptors are  expressed primarily in the brain and are thought to be  responsible for most of the known effects of marijuana  use, such as euphoria and appetite stimulation. Marijuana  continues to be used medicinally as an anti-emetic and  appetite stimulant, especially in patients receiving  chemotherapy (7). However, another study showed that  gastric emptying was significantly delayed by THC in  healthy volunteers, an effect unexpected for an anti-emetic  (8). Cannabinoid Hyperemesis Syndrome is a paradoxical  reaction occurring with prolonged THC exposure, resulting  in recurrent bouts of nausea and vomiting on a weekly  or monthly basis with complete symptom resolution  between episodes. This alternating pattern is seen against  a background of continuous marijuana use, obscuring a  straightforward cause and effect relationship. Physician  unfamiliarity with this entity leads to a delay in diagnosis.  While the mechanism of this syndrome remains unknown,  it may involve  chronic dysregulation of the endogenous  cannabinoid system or overwhelming episodic gastroparesis.  Further studies on cannabinoids and their effect on  GI motility may help elucidate the involved pathways.  Transient symptomatic relief from nausea by hot  showers is a unique and interesting characteristic of this  syndrome. The extent to which this behavior is adopted in  the case presented is consistent with the previous reports  of an almost universal tendency to bathe compulsively,  even to the point of inappropriately warranting a diagnosis  of Obsessive-Compulsive Disorder (9). Duration of  marijuana use preceding symptoms is another interesting  facet of this case. Until now, all reported cases have  involved habitual marijuana use for approximately 10  years preceding diagnosis. In contrast, the duration of  marijuana exposure in the case presented here was 18  months. This suggests a need to redefine habitual marijuana  use in the context of this syndrome and emphasizes  the importance of consideration of this diagnosis in  patients with cyclic vomiting and marijuana use.  CONCLUSION  Cannabinoid Hyperemesis Syndrome, characterized by  cyclic vomiting, chronic marijuana use, and compulsive  bathing, is a recently recognized but still relatively  unknown entity. Cyclic vomiting creates significant  patient distress and is a financial burden on the healthcare  system. A thorough drug history in patients presenting  with recurrent nausea and vomiting is important.  Recognition of the disorder, appropriate counseling, and  marijuana cessation are critical in treating this condition.  Abstinence from marijuana usually leads to cessation  of symptoms, and failure to improve may suggest  an alternate diagnosis.

## 2024-06-14 NOTE — TELEPHONE ENCOUNTER
"----- Message from Kenneth Chaudhari MD sent at 2024  1:23 PM CDT -----  Procedure: EGD    Diagnosis: Nausea/Vomiting    Procedure Timin-12 weeks    #If within 4 weeks selected, please alysha as high priority#    #If greater than 12 weeks, please select "4-12 weeks" and delay sending until 2 months prior to requested date#     Provider: Myself    Location: No Preference    Additional Scheduling Information: No scheduling concerns    Prep Specifications:N/A    Have you attached a patient to this message: yes  "

## 2024-06-14 NOTE — PROGRESS NOTES
"GENERAL GI PATIENT INTAKE:    COVID symptoms in the last 7 days (runny nose, sore throat, congestion, cough, fever): No  PCP: Bailee Baird  If not PCP-  number given to establish [phone]: No    ALLERGIES REVIEWED:  Yes    CHIEF COMPLAINT:    Chief Complaint   Patient presents with    Initial Visit    Diarrhea    Nausea    Emesis       VITAL SIGNS:  /64   Pulse 65   Ht 5' 5" (1.651 m)   Wt 65.5 kg (144 lb 6.4 oz)   LMP 05/31/2024 (Exact Date)   BMI 24.03 kg/m²      Change in medical, surgical, family or social history: No      REVIEWED MEDICATION LIST RECONCILED INCLUDING ABOVE MEDS:  Yes     "

## 2024-06-14 NOTE — PROGRESS NOTES
[facility] Consultation Note    Reason for Consult:  Diagnoses of Nausea and vomiting, unspecified vomiting type and Diarrhea, unspecified type were pertinent to this visit.    PCP:   Bailee Baird   [address] [place] [id number]    Referring MD:  Bailee Baird Md  [address] [address]    Initial History of Present Illness (HPI):  This is a 24 y.o. female here for evaluation of ER followup  Shoulder has popped out a few times and she can put it back in  Foods bother her now which didn't bother her before, some nausea with this  Had an episode like this last may as well where she salivates, can't keep food down for a few days    Does have an urge for hot showers when this flares up    Abdominal pain - epigastric  Reflux - no  Dysphagia - no   Bowel habits - normal  GI bleeding - none  NSAID usage - none        ROS:  Constitutional: No fevers, chills, No weight loss  ENT: No allergies  CV: No chest pain  Pulm: No cough, No shortness of breath  Ophtho: No vision changes  GI: see HPI  Derm: No rash  Heme: No lymphadenopathy, No bruising  MSK: No arthritis  : No dysuria, No hematuria  Endo: No hot or cold intolerance  Neuro: No syncope, No seizure  Psych: No anxiety, No depression    Medical History:  has a past medical history of ADHD (attention deficit hyperactivity disorder), Allergy, Anxiety, Asthma, Depression, Psychiatric problem, and Therapy.    Surgical History:  has a past surgical history that includes Examination under anesthesia (N/A, 8/4/2020) and Rotator cuff repair.    Family History: family history includes ADD / ADHD in her sister; Drug abuse in her maternal grandfather, maternal grandmother, paternal grandfather, and paternal grandmother; Hypertension in her maternal grandfather, maternal grandmother, mother, paternal grandfather, and paternal grandmother..     Social History:  reports that she has never smoked. She has never used smokeless tobacco. She  "reports current alcohol use. She reports current drug use. Drug: Marijuana.    Review of patient's allergies indicates:  No Known Allergies    Medication List with Changes/Refills   Current Medications    ALBUTEROL (PROVENTIL/VENTOLIN HFA) 90 MCG/ACTUATION INHALER    Inhale 2 puffs into the lungs every 6 (six) hours as needed for Wheezing or Shortness of Breath.    CETIRIZINE (ZYRTEC) 10 MG TABLET    Take 10 mg by mouth once daily.    DEXTROAMPHETAMINE-AMPHETAMINE (ADDERALL) 5 MG TAB    Take 1 tablet po q am and 1 tablet po at noon prn inattention    FAMOTIDINE (PEPCID) 20 MG TABLET    Take 1 tablet (20 mg total) by mouth 2 (two) times daily. for 14 days    FLUTICASONE PROPIONATE (FLONASE) 50 MCG/ACTUATION NASAL SPRAY    1 spray (50 mcg total) by Each Nostril route 2 (two) times daily.    HYDROXYZINE PAMOATE (VISTARIL) 25 MG CAP    Take 1 capsule (25 mg total) by mouth 4 (four) times daily as needed (anxiety).    NORGESTREL-ETHINYL ESTRADIOL (LO/OVRAL) 0.3-30 MG-MCG PER TABLET    Take 1 tablet by mouth once daily.    ONDANSETRON (ZOFRAN-ODT) 4 MG TBDL    Take 1 tablet (4 mg total) by mouth every 6 (six) hours as needed (nausea).    PROMETHAZINE (PHENERGAN) 12.5 MG TAB    Take 1 tablet (12.5 mg total) by mouth 4 (four) times daily as needed (severe nausea/vomiting).         Objective Findings:    Vital Signs:  /64   Pulse 65   Ht 5' 5" (1.651 m)   Wt 65.5 kg (144 lb 6.4 oz)   LMP 05/31/2024 (Exact Date)   BMI 24.03 kg/m²   Body mass index is 24.03 kg/m².    Physical Exam:  General Appearance: Well appearing in no acute distress  Head:   Normocephalic, without obvious abnormality  Eyes:    No scleral icterus, EOMI  ENT: Neck supple, Lips, mucosa, and tongue normal; teeth and gums normal  Lungs: CTA bilaterally in anterior and posterior fields, no wheezes, no crackles.  Heart:  Regular rate and rhythm, S1, S2 normal, no murmurs heard  Abdomen: Soft, non tender, non distended with positive bowel sounds in all " "four quadrants. No hepatosplenomegaly, ascites, or mass  Extremities: 2+ pulses, no clubbing, cyanosis or edema  Skin: No rash  Neurologic: CN II-XII intact      Labs:  Lab Results   Component Value Date    WBC 5.99 06/01/2024    HGB 13.8 06/01/2024    HCT 40.4 06/01/2024     06/01/2024    ALT 8 (L) 06/01/2024    AST 25 06/01/2024     06/01/2024    K 4.1 06/01/2024     06/01/2024    CREATININE 1.2 06/01/2024    BUN 13 06/01/2024    CO2 22 (L) 06/01/2024    TSH 1.120 02/23/2022    HGBA1C 5.0 04/28/2021       No results found for: "HPYLORINTERP"  No results found for: "HPYLORIANTIG"        Imaging:    Endoscopy:          Assessment:  1. Nausea and vomiting, unspecified vomiting type    2. Diarrhea, unspecified type           Recommendations:  1. Start off with EGD  2. Discussed possibility of cannabis hyperemesis with her tox screen and urge for hot showers. She understands this is high on the differential and the treatment is abstinence.  Info provided    No follow-ups on file.      Order summary:         Thank you so much for allowing me to participate in the care of Yahaira [place] Phong Chaudhari MD        "

## 2024-06-14 NOTE — TELEPHONE ENCOUNTER
Spoke to Tea to schedule procedure(s) Upper Endoscopy (EGD)       Physician to perform procedure(s) Dr. MUSTAPHA Chaudhari  Date of Procedure (s) 8/8/24  Arrival Time 12:00 PM  Time of Procedure(s) 1:00 PM   [place] of Procedure(s[place] [facility] [place] [facility] [place]--Enter at the rear of the building through the emergency department screening station or the Outpatient Registration door, then continue to endoscopy department on the 2nd floor.    Type of Rx Prep sent to patient: Other  Instructions provided to patient via [facility]    Patient was informed on the following information and verbalized understanding. Screening questionnaire reviewed with patient and complete. If procedure requires anesthesia, a responsible adult needs to be present to accompany the patient home, patient cannot drive after receiving anesthesia. Appointment details are tentative, especially check-in time. Patient will receive a prep-op call 7 days prior to confirm check-in time for procedure. If applicable the patient should contact their pharmacy to verify Rx for procedure prep is ready for pick-up. Patient was advised to call the scheduling department at [phone] if pharmacy states no Rx is available. Patient was advised to call the endoscopy scheduling department if any questions or concerns arise.       Endoscopy Scheduling Department

## 2024-06-21 ENCOUNTER — PATIENT MESSAGE (OUTPATIENT)
Dept: GASTROENTEROLOGY | Facility: CLINIC | Age: 24
End: 2024-06-21
Payer: COMMERCIAL

## 2024-06-29 ENCOUNTER — OFFICE VISIT (OUTPATIENT)
Dept: URGENT CARE | Facility: CLINIC | Age: 24
End: 2024-06-29
Payer: COMMERCIAL

## 2024-06-29 VITALS
WEIGHT: 140.19 LBS | TEMPERATURE: 99 F | HEIGHT: 65 IN | BODY MASS INDEX: 23.36 KG/M2 | HEART RATE: 78 BPM | SYSTOLIC BLOOD PRESSURE: 108 MMHG | RESPIRATION RATE: 16 BRPM | OXYGEN SATURATION: 97 % | DIASTOLIC BLOOD PRESSURE: 64 MMHG

## 2024-06-29 DIAGNOSIS — H10.32 ACUTE BACTERIAL CONJUNCTIVITIS OF LEFT EYE: Primary | ICD-10-CM

## 2024-06-29 PROCEDURE — 99213 OFFICE O/P EST LOW 20 MIN: CPT | Mod: S$GLB,,,

## 2024-06-29 RX ORDER — OFLOXACIN 3 MG/ML
1 SOLUTION/ DROPS OPHTHALMIC 4 TIMES DAILY
Qty: 10 ML | Refills: 0 | Status: SHIPPED | OUTPATIENT
Start: 2024-06-29 | End: 2024-07-06

## 2024-06-29 NOTE — PROGRESS NOTES
"Subjective:      Patient ID: Yahaira Darling is a 24 y.o. female.    Vitals:  height is 5' 5" (1.651 m) and weight is 63.6 kg (140 lb 3.4 oz). Her temperature is 98.5 °F (36.9 °C). Her blood pressure is 108/64 and her pulse is 78. Her respiration is 16 and oxygen saturation is 97%.     Chief Complaint: Eye Problem    Pt here for left eye redness onset 2 days ago. Pt sts it has been watering as well and woke up today with it crusty. Pt denies any pain, itching, irritation, blurry vision, or FB. Pt has not done anything for relief. Pt sts she did start back taking her allergy meds thinking that was the issue. Pt sts she does not wear contacts.     Provider note starts below:  Patient presents to clinic for evaluation of left eye redness, discomfort, discharge, and photophobia which onset 2 days ago.  Patient states she woke up this morning with dried crust around her eye.  She has also had watery discharge since symptoms onset.  She denies any eye pain or itching but states it feels uncomfortable when she blinks.  Denies any known trauma or foreign body to the eye.  She does currently have eyelash extensions but states they were placed over 1 month ago.  She does not wear contact lenses.  Patient denies any fever, chills, nausea, vomiting, eye pain, blurry vision, double vision, vision loss, headache, eyelid swelling, dizziness, lightheadedness.  No other complaints.    Eye Problem   The left eye is affected. This is a new problem. The current episode started in the past 7 days. The problem occurs constantly. The problem has been unchanged. There was no injury mechanism. The pain is at a severity of 0/10. The patient is experiencing no pain. There is No known exposure to pink eye. She Does not wear contacts. Associated symptoms include an eye discharge, eye redness and photophobia. Pertinent negatives include no blurred vision, double vision, fever, foreign body sensation, itching, nausea, recent URI or " vomiting. She has tried nothing for the symptoms. The treatment provided no relief.       Constitution: Negative for activity change, appetite change, chills and fever.   HENT:  Negative for ear pain, congestion, sinus pain and sore throat.    Neck: Negative for neck pain and neck stiffness.   Cardiovascular:  Negative for chest pain and palpitations.   Eyes:  Positive for eye discharge, eye redness and photophobia. Negative for eye trauma, foreign body in eye, eye itching, eye pain, vision loss, double vision, blurred vision and eyelid swelling.   Respiratory:  Negative for cough.    Gastrointestinal:  Negative for nausea and vomiting.   Skin:  Negative for pale and rash.   Neurological:  Negative for dizziness, light-headedness, facial drooping, speech difficulty, loss of balance, headaches, disorientation and altered mental status.   Psychiatric/Behavioral:  Negative for altered mental status, disorientation and confusion.       Objective:     Physical Exam   Constitutional: She is oriented to person, place, and time.  Non-toxic appearance. She does not appear ill. No distress.   HENT:   Head: Normocephalic and atraumatic.   Eyes: Lids are normal. Left eye exhibits chemosis and discharge. Left eye exhibits no exudate and no hordeolum. No foreign body present in the left eye. Left conjunctiva is injected. Left conjunctiva has no hemorrhage. Extraocular movement intact vision grossly intact gaze aligned appropriately   Neck: Neck supple.   Cardiovascular: Normal rate, regular rhythm, normal heart sounds and normal pulses.   Pulmonary/Chest: Effort normal and breath sounds normal.   Abdominal: Normal appearance.   Musculoskeletal: Normal range of motion.         General: Normal range of motion.   Neurological: She is alert, oriented to person, place, and time and at baseline.   Skin: Skin is warm and dry.   Psychiatric: Her behavior is normal. Mood normal.   Nursing note and vitals reviewed.    Assessment:     1.  Acute bacterial conjunctivitis of left eye        Plan:     Acute bacterial conjunctivitis of left eye  -     ofloxacin (OCUFLOX) 0.3 % ophthalmic solution; Place 1 drop into the left eye 4 (four) times daily. for 7 days  Dispense: 10 mL; Refill: 0            Patient Instructions   Treatment  Ofloxacin antibiotic eye drops, 1 drop in left eye 4 times daily for 7 days.  Common side effects include stinging and burning upon instillation. Patients may find it helpful to refrigerate the drops and/or use refrigerated artificial tears before using these medications. Other adverse effects include headache and increased ocular dryness.     Care at home  Wash your hands before touching your eyes. Gently remove your eye drainage or crusting with a clean cloth and warm water.  Avoid pollen if an allergy is causing your pink eye. Stay inside as much as you can with the windows closed during peak allergy seasons.  Lubricating eye drops or allergy eye drops may help your eye feel better. Make sure to read the directions carefully. Wash your hands with care before and after you touch your eye.  When you use eye drops, take care not to touch the bottle or dropper to your eye.  If you wear contact lenses, you will need to stop wearing them for a short time until your symptoms go away. If your contacts are disposable, start with fresh ones after your eye gets better. If not, clean your contacts with care. Clean your contact case thoroughly or get a new one.  Avoid sharing towels, washcloths, bedding, or personal items when you have pink eye.  You may need to stay out of work or school for a few days.    Please remember that you have received care at an urgent care today. Urgent cares are not emergency rooms and are not equipped to handle life threatening emergencies and cannot rule in or out certain medical conditions and you may be released before all of your medical problems are known or treated.     Please arrange follow up with  your primary care physician or speciality clinic within 2-5 days if your signs and symptoms have not resolved or worsen.     Patient can call our Referral Hotline at [phone] to make an appointment.    Please return here or go to the Emergency Department for any concerns or worsening of condition.   (Worsening vision, eye pain, sensitivity to light, increased eye discharge)

## 2024-06-29 NOTE — PATIENT INSTRUCTIONS
Treatment  Ofloxacin antibiotic eye drops, 1 drop in left eye 4 times daily for 7 days.  Common side effects include stinging and burning upon instillation. Patients may find it helpful to refrigerate the drops and/or use refrigerated artificial tears before using these medications. Other adverse effects include headache and increased ocular dryness.     Care at home  Wash your hands before touching your eyes. Gently remove your eye drainage or crusting with a clean cloth and warm water.  Avoid pollen if an allergy is causing your pink eye. Stay inside as much as you can with the windows closed during peak allergy seasons.  Lubricating eye drops or allergy eye drops may help your eye feel better. Make sure to read the directions carefully. Wash your hands with care before and after you touch your eye.  When you use eye drops, take care not to touch the bottle or dropper to your eye.  If you wear contact lenses, you will need to stop wearing them for a short time until your symptoms go away. If your contacts are disposable, start with fresh ones after your eye gets better. If not, clean your contacts with care. Clean your contact case thoroughly or get a new one.  Avoid sharing towels, washcloths, bedding, or personal items when you have pink eye.  You may need to stay out of work or school for a few days.    Please remember that you have received care at an urgent care today. Urgent cares are not emergency rooms and are not equipped to handle life threatening emergencies and cannot rule in or out certain medical conditions and you may be released before all of your medical problems are known or treated.     Please arrange follow up with your primary care physician or speciality clinic within 2-5 days if your signs and symptoms have not resolved or worsen.     Patient can call our Referral Hotline at [phone] to make an appointment.    Please return here or go to the Emergency Department for any concerns or  worsening of condition.   (Worsening vision, eye pain, sensitivity to light, increased eye discharge)

## 2024-07-30 ENCOUNTER — OFFICE VISIT (OUTPATIENT)
Dept: PSYCHIATRY | Facility: CLINIC | Age: 24
End: 2024-07-30
Payer: COMMERCIAL

## 2024-07-30 VITALS
DIASTOLIC BLOOD PRESSURE: 57 MMHG | BODY MASS INDEX: 23.46 KG/M2 | WEIGHT: 141 LBS | SYSTOLIC BLOOD PRESSURE: 104 MMHG | HEART RATE: 79 BPM

## 2024-07-30 DIAGNOSIS — F41.1 GENERALIZED ANXIETY DISORDER WITH PANIC ATTACKS: ICD-10-CM

## 2024-07-30 DIAGNOSIS — F90.2 ADHD (ATTENTION DEFICIT HYPERACTIVITY DISORDER), COMBINED TYPE: Primary | ICD-10-CM

## 2024-07-30 DIAGNOSIS — F41.0 GENERALIZED ANXIETY DISORDER WITH PANIC ATTACKS: ICD-10-CM

## 2024-07-30 DIAGNOSIS — F33.0 MDD (MAJOR DEPRESSIVE DISORDER), RECURRENT EPISODE, MILD: ICD-10-CM

## 2024-07-30 PROCEDURE — 1160F RVW MEDS BY RX/DR IN RCRD: CPT | Mod: CPTII,S$GLB,, | Performed by: NURSE PRACTITIONER

## 2024-07-30 PROCEDURE — 3074F SYST BP LT 130 MM HG: CPT | Mod: CPTII,S$GLB,, | Performed by: NURSE PRACTITIONER

## 2024-07-30 PROCEDURE — 3008F BODY MASS INDEX DOCD: CPT | Mod: CPTII,S$GLB,, | Performed by: NURSE PRACTITIONER

## 2024-07-30 PROCEDURE — 99214 OFFICE O/P EST MOD 30 MIN: CPT | Mod: S$GLB,,, | Performed by: NURSE PRACTITIONER

## 2024-07-30 PROCEDURE — 99999 PR PBB SHADOW E&M-EST. PATIENT-LVL II: CPT | Mod: PBBFAC,,, | Performed by: NURSE PRACTITIONER

## 2024-07-30 PROCEDURE — 1159F MED LIST DOCD IN RCRD: CPT | Mod: CPTII,S$GLB,, | Performed by: NURSE PRACTITIONER

## 2024-07-30 PROCEDURE — 3078F DIAST BP <80 MM HG: CPT | Mod: CPTII,S$GLB,, | Performed by: NURSE PRACTITIONER

## 2024-07-30 NOTE — PROGRESS NOTES
7/30/2024   Yahaira Darling  2000  [id number]    Outpatient Psychiatry Follow-Up Visit (MD/NP)         Chief Complaint:  Yahaira Darling, a 24 y.o. female,who presents today for follow up of ADHD, depression, and anxiety.  Met with patient.        Interval History/Subjective Report/Content of Current Session:     Pt was last seen by me on 7/13/22.    Pt states she is here today to refill her Adderall. States she ran out around March of this year. Only takes it during school. 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  States Adderall 5 mg wears off after about 2 hours.   Her drug screen in the ED last month was + for THC. I explained that she will have to provide a neg UDS prior to my prescribing a stimulant med. She verbalized understanding and states she last used THC about 2 weeks ago.    Mood is up and down. Appetite is ok. Anxiety is heightened. She states she is not interested in taking an antidepressant. She took Zoloft in the past and it caused next day grogginess.    Pt denies tremor, tic, insomnia, headache, GI upset, CP, and heart palpitations. Most recent documented VS reviewed. Pt does take drug holidays from the stimulant medication.     Pt denies recurrent thoughts of death and denies SI/HI. Denies any sxs of kristi. Denies AVH, paranoia and delusions. No objective s/sx of psychosis or kristi.       Psychotherapy:  Target symptoms: distractability, lack of focus  Why chosen therapy is appropriate versus another modality: relevant to diagnosis, patient responds to this modality  Outcome monitoring methods: self-report, observation  Therapeutic intervention type: supportive psychotherapy  Topics discussed/themes: building skills sets for symptom management  The patient's response to the intervention is accepting. The patient's progress toward treatment goals is good.   Duration of intervention: 4 minutes.        Psychotropic medication review  Previous Trials-  Adderall  Zoloft  - next day grogginess    Current meds-  None          Review of Systems       Review of Systems   Constitutional:  Negative for chills, fever and malaise/fatigue.   Respiratory:  Negative for cough and shortness of breath.    Cardiovascular:  Negative for chest pain and palpitations.   Gastrointestinal:  Negative for abdominal pain, diarrhea and vomiting.   Genitourinary:  Negative for dysuria and hematuria.   Musculoskeletal:  Negative for falls and myalgias.   Skin:  Negative for rash.   Neurological:  Negative for tremors, seizures and headaches.   Psychiatric/Behavioral:          See HPI         Past Medical, Family and Social History: The patient's past medical, family and social history, allergies, current medications, past surgical history, and problem list have been reviewed and updated as appropriate within the electronic medical record.    Compliance: see HPI        Risk Parameters:  Patient reports no suicidal ideation  Patient reports no homicidal ideation  Patient reports no self-injurious behavior  Patient reports no violent behavior    Exam (detailed: at least 9 elements; comprehensive: all 15 elements)   Constitutional  Vitals:  Most recent vital signs, dated less than 90 days prior to this appointment, were reviewed.   Vitals:    07/30/24 0901   BP: (!) 104/57   Pulse: 79   Weight: 64 kg (140 lb 15.8 oz)              Musculoskeletal  Muscle Strength/Tone:  no dyskinesia, no dystonia, no tremor, no tic   Gait & Station:  non-ataxic     Psychiatric      Appearance:  unremarkable, age appropriate, normal weight, well nourished, casually dressed, neatly groomed   Behavior:  normal, friendly and cooperative, eye contact normal     Speech:  no latency; no press   Mood & Affect:  euthymic  congruent and appropriate   Thought Process:  normal and logical   Associations:  intact   Thought Content:  normal, no suicidality, no homicidality, delusions, or paranoia   Insight:  intact, has awareness of illness    Judgement: behavior is adequate to circumstances, age appropriate   Orientation:  grossly intact   Memory: intact for content of interview   Language: grossly intact   Attention Span & Concentration:  able to focus   Fund of Knowledge:  intact and appropriate to age and level of education     Medications:  Outpatient Encounter Medications as of 7/30/2024   Medication Sig Dispense Refill    albuterol (PROVENTIL/VENTOLIN HFA) 90 mcg/actuation inhaler Inhale 2 puffs into the lungs every 6 (six) hours as needed for Wheezing or Shortness of Breath. 8 g 0    cetirizine (ZYRTEC) 10 MG tablet Take 10 mg by mouth once daily.      dextroamphetamine-amphetamine (ADDERALL) 5 mg Tab Take 1 tablet po q am and 1 tablet po at noon prn inattention 60 tablet 0    famotidine (PEPCID) 20 MG tablet Take 1 tablet (20 mg total) by mouth 2 (two) times daily. for 14 days 28 tablet 0    fluticasone propionate (FLONASE) 50 mcg/actuation nasal spray 1 spray (50 mcg total) by Each Nostril route 2 (two) times daily. 9.9 mL 0    hydrOXYzine pamoate (VISTARIL) 25 MG Cap Take 1 capsule (25 mg total) by mouth 4 (four) times daily as needed (anxiety). 20 capsule 0    norgestrel-ethinyl estradioL (LO/OVRAL) 0.3-30 mg-mcg per tablet Take 1 tablet by mouth once daily. (Patient not taking: Reported on 6/29/2024) 84 tablet 3    ondansetron (ZOFRAN-ODT) 4 MG TbDL Take 1 tablet (4 mg total) by mouth every 6 (six) hours as needed (nausea). 20 tablet 0    promethazine (PHENERGAN) 12.5 MG Tab Take 1 tablet (12.5 mg total) by mouth 4 (four) times daily as needed (severe nausea/vomiting). 12 tablet 0     No facility-administered encounter medications on file as of 7/30/2024.       Allergy:  Review of patient's allergies indicates:  No Known Allergies      Assessment and Diagnosis   Status/Progress: Based on the examination today, the patient's problem(s) is/are well controlled.  New problems have not been presented today.   Co-morbidities are not complicating  management of the primary condition.  There are no active rule-out diagnoses for this patient at this time.       General Impression:       ICD-10-CM ICD-9-CM   1. ADHD (attention deficit hyperactivity disorder), combined type  F90.2 314.01   2. MDD (major depressive disorder), recurrent episode, mild  F33.0 296.31   3. Generalized anxiety disorder with panic attacks  F41.1 300.02    F41.0 300.01         Intervention/Counseling/Treatment Plan     Medication Management:  No meds rx'd today. Will plan to start Adderall XR 10 mg q am prn and Adderall 5 mg q day at noon prn inattention once she provides a neg UDS.  Labs: reviewed most recent; order UDS  The treatment plan and follow up plan were reviewed with the patient.  Discussed with patient informed consent, risks vs. benefits, alternative treatments, side effect profile and the inherent unpredictability of individual responses to treatments and all medications prescribed. The patient expresses understanding of the above and displays the capacity to agree with this current plan and had no other questions.  Encouraged Patient to keep future appointments.   Take medications as prescribed and abstain from substance abuse.   Pt was told to present to ED or call 911 for SI/HI plan or intent, psychosis, or other psychiatric or medical emergency, and pt agrees to this and verbalized understanding.        Return to Clinic: 3 months, or sooner if needed      Face-to-face time with patient:  23 minutes  Total time:  40 minutes of total time spent on the encounter, which includes face to face time and non-face to face time preparing to see the patient (eg, review of tests), Obtaining and/or reviewing separately obtained history, Documenting clinical information in the electronic or other health record, Independently interpreting results (not separately reported) and communicating results to the patient/family/caregiver, or Care coordination (not separately reported).        Penny Michael, MSN, APRN, PMHNP-BC  [facility]

## 2024-08-01 ENCOUNTER — TELEPHONE (OUTPATIENT)
Dept: ENDOSCOPY | Facility: HOSPITAL | Age: 24
End: 2024-08-01
Payer: COMMERCIAL

## 2024-08-01 NOTE — TELEPHONE ENCOUNTER
Left voicemail and sent portal message for patient to call Endoscopy Scheduling to review instructions and confirm appointment for Upper endoscopy (EGD)  on 8/8/24.

## 2024-08-01 NOTE — TELEPHONE ENCOUNTER
Pt returned call to confirm Upper Endoscopy (EGD). Pt has instructions and confirmed ride. Verified if any GLP1's and blood thinners. Pre-call complete, Pt does not have any further questions. Arrival time:  12:00 pm

## 2024-08-08 ENCOUNTER — ANESTHESIA (OUTPATIENT)
Dept: ENDOSCOPY | Facility: HOSPITAL | Age: 24
End: 2024-08-08
Payer: COMMERCIAL

## 2024-08-08 ENCOUNTER — ANESTHESIA EVENT (OUTPATIENT)
Dept: ENDOSCOPY | Facility: HOSPITAL | Age: 24
End: 2024-08-08
Payer: COMMERCIAL

## 2024-08-08 ENCOUNTER — HOSPITAL ENCOUNTER (OUTPATIENT)
Facility: HOSPITAL | Age: 24
Discharge: HOME | End: 2024-08-08
Attending: INTERNAL MEDICINE | Admitting: INTERNAL MEDICINE
Payer: COMMERCIAL

## 2024-08-08 VITALS
SYSTOLIC BLOOD PRESSURE: 111 MMHG | HEART RATE: 74 BPM | RESPIRATION RATE: 16 BRPM | OXYGEN SATURATION: 97 % | TEMPERATURE: 98 F | DIASTOLIC BLOOD PRESSURE: 69 MMHG

## 2024-08-08 DIAGNOSIS — R11.0 NAUSEA: ICD-10-CM

## 2024-08-08 DIAGNOSIS — R11.2 NAUSEA AND VOMITING, UNSPECIFIED VOMITING TYPE: Primary | ICD-10-CM

## 2024-08-08 LAB
B-HCG UR QL: NEGATIVE
CTP QC/QA: YES

## 2024-08-08 PROCEDURE — 88305 TISSUE EXAM BY PATHOLOGIST: CPT | Performed by: PATHOLOGY

## 2024-08-08 PROCEDURE — 37000008 HC ANESTHESIA 1ST 15 MINUTES: Performed by: INTERNAL MEDICINE

## 2024-08-08 PROCEDURE — 25000003 PHARM REV CODE 250: Performed by: STUDENT IN AN ORGANIZED HEALTH CARE EDUCATION/TRAINING PROGRAM

## 2024-08-08 PROCEDURE — 43239 EGD BIOPSY SINGLE/MULTIPLE: CPT | Mod: ,,, | Performed by: INTERNAL MEDICINE

## 2024-08-08 PROCEDURE — 27201012 HC FORCEPS, HOT/COLD, DISP: Performed by: INTERNAL MEDICINE

## 2024-08-08 PROCEDURE — 63600175 PHARM REV CODE 636 W HCPCS: Performed by: STUDENT IN AN ORGANIZED HEALTH CARE EDUCATION/TRAINING PROGRAM

## 2024-08-08 PROCEDURE — 37000009 HC ANESTHESIA EA ADD 15 MINS: Performed by: INTERNAL MEDICINE

## 2024-08-08 PROCEDURE — 81025 URINE PREGNANCY TEST: CPT | Performed by: INTERNAL MEDICINE

## 2024-08-08 PROCEDURE — 43239 EGD BIOPSY SINGLE/MULTIPLE: CPT | Performed by: INTERNAL MEDICINE

## 2024-08-08 RX ORDER — PROPOFOL 10 MG/ML
VIAL (ML) INTRAVENOUS
Status: DISCONTINUED
Start: 2024-08-08 | End: 2024-08-08 | Stop reason: HOSPADM

## 2024-08-08 RX ORDER — LIDOCAINE HYDROCHLORIDE 20 MG/ML
INJECTION INTRAVENOUS
Status: DISCONTINUED | OUTPATIENT
Start: 2024-08-08 | End: 2024-08-08

## 2024-08-08 RX ORDER — LIDOCAINE HYDROCHLORIDE 20 MG/ML
INJECTION, SOLUTION EPIDURAL; INFILTRATION; INTRACAUDAL; PERINEURAL
Status: DISCONTINUED
Start: 2024-08-08 | End: 2024-08-08 | Stop reason: HOSPADM

## 2024-08-08 RX ORDER — PROPOFOL 10 MG/ML
VIAL (ML) INTRAVENOUS
Status: DISCONTINUED | OUTPATIENT
Start: 2024-08-08 | End: 2024-08-08

## 2024-08-08 RX ORDER — ONDANSETRON HYDROCHLORIDE 2 MG/ML
INJECTION, SOLUTION INTRAVENOUS
Status: DISCONTINUED
Start: 2024-08-08 | End: 2024-08-08 | Stop reason: HOSPADM

## 2024-08-08 RX ADMIN — PROPOFOL 50 MG: 10 INJECTION, EMULSION INTRAVENOUS at 01:08

## 2024-08-08 RX ADMIN — LIDOCAINE HYDROCHLORIDE 140 MG: 20 INJECTION, SOLUTION INTRAVENOUS at 01:08

## 2024-08-08 RX ADMIN — PROPOFOL 100 MG: 10 INJECTION, EMULSION INTRAVENOUS at 01:08

## 2024-08-08 RX ADMIN — SODIUM CHLORIDE: 0.9 INJECTION, SOLUTION INTRAVENOUS at 01:08

## 2024-08-08 NOTE — H&P
Short Stay Endoscopy History and Physical    PCP - Bailee Baird MD     Procedure - EGD  ASA - per anesthesia  Mallampati - per anesthesia  History of Anesthesia problems - no  Family history Anesthesia problems -  no   Plan of anesthesia - General    HPI:  This is a 24 y.o. female here for evaluation of :     Nausea & vomiting; concern for cannabinoid hyperemesis syndrome      ROS:  Constitutional: No fevers, chills, No weight loss  CV: No chest pain  Pulm: No cough, No shortness of breath  Ophtho: No vision changes  GI: see HPI  Derm: No rash    Medical History:  has a past medical history of ADHD (attention deficit hyperactivity disorder), Allergy, Anxiety, Asthma, Depression, Psychiatric problem, and Therapy.    Surgical History:  has a past surgical history that includes Examination under anesthesia (N/A, 8/4/2020) and Rotator cuff repair.    Family History: family history includes ADD / ADHD in her sister; Drug abuse in her maternal grandfather, maternal grandmother, paternal grandfather, and paternal grandmother; Hypertension in her maternal grandfather, maternal grandmother, mother, paternal grandfather, and paternal grandmother.. Otherwise no colon cancer, inflammatory bowel disease, or GI malignancies.    Social History:  reports that she has been smoking. She has never used smokeless tobacco. She reports current alcohol use. She reports current drug use. Drug: Marijuana.    Review of patient's allergies indicates:  No Known Allergies    Medications:   Medications Prior to Admission   Medication Sig Dispense Refill Last Dose    albuterol (PROVENTIL/VENTOLIN HFA) 90 mcg/actuation inhaler Inhale 2 puffs into the lungs every 6 (six) hours as needed for Wheezing or Shortness of Breath. 8 g 0     cetirizine (ZYRTEC) 10 MG tablet Take 10 mg by mouth once daily.       dextroamphetamine-amphetamine (ADDERALL) 5 mg Tab Take 1 tablet po q am and 1 tablet po at noon prn inattention 60 tablet 0     famotidine  (PEPCID) 20 MG tablet Take 1 tablet (20 mg total) by mouth 2 (two) times daily. for 14 days 28 tablet 0     fluticasone propionate (FLONASE) 50 mcg/actuation nasal spray 1 spray (50 mcg total) by Each Nostril route 2 (two) times daily. 9.9 mL 0     hydrOXYzine pamoate (VISTARIL) 25 MG Cap Take 1 capsule (25 mg total) by mouth 4 (four) times daily as needed (anxiety). 20 capsule 0     norgestrel-ethinyl estradioL (LO/OVRAL) 0.3-30 mg-mcg per tablet Take 1 tablet by mouth once daily. (Patient not taking: Reported on 6/29/2024) 84 tablet 3     ondansetron (ZOFRAN-ODT) 4 MG TbDL Take 1 tablet (4 mg total) by mouth every 6 (six) hours as needed (nausea). 20 tablet 0     promethazine (PHENERGAN) 12.5 MG Tab Take 1 tablet (12.5 mg total) by mouth 4 (four) times daily as needed (severe nausea/vomiting). 12 tablet 0        Physical Exam:    Vital Signs: There were no vitals filed for this visit.    General Appearance: Well appearing in no acute distress  Eyes:    No scleral icterus  ENT: Neck supple, Lips, mucosa, and tongue normal; teeth and gums normal  Abdomen: Soft, non tender, non distended with normal bowel sounds. No hepatosplenomegaly, ascites, or mass.  Extremities: No edema  Skin: No rash    Labs:  Lab Results   Component Value Date    WBC 5.99 06/01/2024    HGB 13.8 06/01/2024    HCT 40.4 06/01/2024     06/01/2024    ALT 8 (L) 06/01/2024    AST 25 06/01/2024     06/01/2024    K 4.1 06/01/2024     06/01/2024    CREATININE 1.2 06/01/2024    BUN 13 06/01/2024    CO2 22 (L) 06/01/2024    TSH 1.120 02/23/2022    HGBA1C 5.0 04/28/2021       I have explained the risks and benefits of endoscopy procedures to the patient including but not limited to bleeding, perforation, infection, and death.  The patient was asked if they understand and allowed to ask any further questions to their satisfaction.      Atul Cid MD

## 2024-08-08 NOTE — PROVATION PATIENT INSTRUCTIONS
Discharge Summary/Instructions after an Endoscopic Procedure  Patient Name: Yahaira Darling  Patient MRN: [id number]  Patient   YOB: 2000 Thursday, August 8, 2024  Kenneth Chaudhari MD  Dear patient,  As a result of recent federal legislation (The Federal Cures Act), you may   receive lab or pathology results from your procedure in your [facility]   account before your physician is able to contact you. Your physician or   their representative will relay the results to you with their   recommendations at their soonest availability.  Thank you,  RESTRICTIONS:  During your procedure today, you received medications for sedation.  These   medications may affect your judgment, balance and coordination.  Therefore,   for 24 hours, you have the following restrictions:   - DO NOT drive a car, operate machinery, make legal/financial decisions,   sign important papers or drink alcohol.    ACTIVITY:  Today: no heavy lifting, straining or running due to procedural   sedation/anesthesia.  The following day: return to full activity including work.  DIET:  Eat and drink normally unless instructed otherwise.     TREATMENT FOR COMMON SIDE EFFECTS:  - Mild abdominal pain, nausea, belching, bloating or excessive gas:  rest,   eat lightly and use a heating pad.  - Sore Throat: treat with throat lozenges and/or gargle with warm salt   water.  - Because air was used during the procedure, expelling large amounts of air   from your rectum or belching is normal.  - If a bowel prep was taken, you may not have a bowel movement for 1-3 days.    This is normal.  SYMPTOMS TO WATCH FOR AND REPORT TO YOUR PHYSICIAN:  1. Abdominal pain or bloating, other than gas cramps.  2. Chest pain.  3. Back pain.  4. Signs of infection such as: chills or fever occurring within 24 hours   after the procedure.  5. Rectal bleeding, which would show as bright red, maroon, or black stools.   (A tablespoon of blood from the rectum is not serious, especially  if   hemorrhoids are present.)  6. Vomiting.  7. Weakness or dizziness.  GO DIRECTLY TO THE NEAREST EMERGENCY ROOM IF YOU HAVE ANY OF THE FOLLOWING:      Difficulty breathing              Chills and/or fever over 101 F   Persistent vomiting and/or vomiting blood   Severe abdominal pain   Severe chest pain   Black, tarry stools   Bleeding- more than one tablespoon   Any other symptom or condition that you feel may need urgent attention  Your doctor recommends these additional instructions:  If any biopsies were taken, your doctors clinic will contact you in 1 to 2   weeks with any results.  - Patient has a contact number available for emergencies.  The signs and   symptoms of potential delayed complications were discussed with the   patient.  Return to normal activities tomorrow.  Written discharge   instructions were provided to the patient.   - Discharge patient to home (with parent).   - Resume previous diet.   - Continue present medications.   - Await pathology results.   - Return to GI clinic PRN.   For questions, problems or results please call your physician - Kenneth Chaudhari MD at Work:  [phone].  [facility] can be reached at [phone]     IF A COMPLICATION OR EMERGENCY SITUATION ARISES AND YOU ARE UNABLE TO REACH   YOUR PHYSICIAN - GO DIRECTLY TO THE EMERGENCY ROOM.  Kenneth Chaudhari MD  8/8/2024 1:36:24 PM  This report has been verified and signed electronically.  Dear patient,  As a result of recent federal legislation (The Federal Cures Act), you may   receive lab or pathology results from your procedure in your [facility]   account before your physician is able to contact you. Your physician or   their representative will relay the results to you with their   recommendations at their soonest availability.  Thank you,  [employer]ATION

## 2024-08-12 ENCOUNTER — PATIENT MESSAGE (OUTPATIENT)
Dept: GASTROENTEROLOGY | Facility: CLINIC | Age: 24
End: 2024-08-12
Payer: COMMERCIAL

## 2024-08-12 LAB
FINAL PATHOLOGIC DIAGNOSIS: NORMAL
GROSS: NORMAL
Lab: NORMAL

## 2024-08-23 DIAGNOSIS — S76.301A HAMSTRING INJURY, RIGHT, INITIAL ENCOUNTER: ICD-10-CM

## 2024-08-23 DIAGNOSIS — M25.512 LEFT SHOULDER PAIN, UNSPECIFIED CHRONICITY: Primary | ICD-10-CM

## 2024-08-23 DIAGNOSIS — S76.302A HAMSTRING INJURY, LEFT, INITIAL ENCOUNTER: ICD-10-CM

## 2024-09-10 ENCOUNTER — CLINICAL SUPPORT (OUTPATIENT)
Dept: REHABILITATION | Facility: HOSPITAL | Age: 24
End: 2024-09-10
Attending: STUDENT IN AN ORGANIZED HEALTH CARE EDUCATION/TRAINING PROGRAM
Payer: COMMERCIAL

## 2024-09-10 DIAGNOSIS — S76.301A HAMSTRING INJURY, RIGHT, INITIAL ENCOUNTER: ICD-10-CM

## 2024-09-10 DIAGNOSIS — S76.302A HAMSTRING INJURY, LEFT, INITIAL ENCOUNTER: ICD-10-CM

## 2024-09-10 DIAGNOSIS — M79.604 PAIN IN BOTH LOWER EXTREMITIES: Primary | ICD-10-CM

## 2024-09-10 DIAGNOSIS — M25.512 LEFT SHOULDER PAIN, UNSPECIFIED CHRONICITY: ICD-10-CM

## 2024-09-10 DIAGNOSIS — M79.605 PAIN IN BOTH LOWER EXTREMITIES: Primary | ICD-10-CM

## 2024-09-10 DIAGNOSIS — R53.1 WEAKNESS: ICD-10-CM

## 2024-09-10 PROCEDURE — 97161 PT EVAL LOW COMPLEX 20 MIN: CPT | Performed by: PHYSICAL THERAPIST

## 2024-09-10 PROCEDURE — 97110 THERAPEUTIC EXERCISES: CPT | Performed by: PHYSICAL THERAPIST

## 2024-09-12 NOTE — PROGRESS NOTES
[facility]   Physical Therapy Initial Evaluation      Name: Yahaira Darling  Clinic Number: [id number]    Therapy Diagnosis:   Encounter Diagnoses   Name Primary?    Left shoulder pain, unspecified chronicity     Hamstring injury, left, initial encounter     Hamstring injury, right, initial encounter     Pain in both lower extremities Yes    Weakness         Physician: Mary Kate Monaco DO    Physician Orders: PT Eval and Treat   Medical Diagnosis from Referral:   M25.512 (ICD-10-CM) - Left shoulder pain, unspecified chronicity   S76.302A (ICD-10-CM) - Hamstring injury, left, initial encounter   S76.301A (ICD-10-CM) - Hamstring injury, right, initial encounter     Evaluation Date: 9/10/2024  Authorization Period Expiration: 8/23/24  Plan of Care Expiration: 12/31/24  Progress Note Due: 9/31/24  Visit # / Visits authorized: 1/ 1   FOTO: 1/1    Precautions: Standard     Time In: 1400  Time Out: 1500  Total Appointment Time (timed & untimed codes): 60 minutes    Subjective     Date of onset: chronic    History of current condition - Dawit reports: 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 She c/o curtis posterior thigh located in proximal, medial hamstrings. She had had this pain off and on since high school and has not every full improved. Sprinter. Seen by me in May of this year to which hamstrings were severely irritable. States she has rested since end of season and they are better but not fully improved. Currently in preseason S&C. Has some pain with performing power cleans, closed chain hamstring work. Denies N/T, radiating pain. Denies LBP, hip pain. Currently in season which ends in May.     Falls: none    Imaging: see jayda:     Prior Therapy: PT one visit [place]  Social History:  lives with their family  Occupation: student athlete  Prior Level of Function: sprinter, pain in hamstrings, unable to perform at 100%  Current Level of Function: improved from March but continues  to have pain and weakness    Pain:  Current 0/10, worst 5/10, best 0/10   Location: curtis hamstring    Description: dull, ache, cramp  Aggravating Factors: sprinting, power clean, single leg hamstring work  Easing Factors: rest    Patients goals: return to sprinting 100%     Medical History:   Past Medical History:   Diagnosis Date    ADHD (attention deficit hyperactivity disorder)     Allergy     Anxiety     Asthma     Depression     Psychiatric problem     Therapy        Surgical History:   Yahaira Darling  has a past surgical history that includes Examination under anesthesia (N/A, 8/4/2020); Rotator cuff repair; and Esophagogastroduodenoscopy (N/A, 8/8/2024).    Medications:   Yahaira West has a current medication list which includes the following prescription(s): albuterol, cetirizine, dextroamphetamine-amphetamine, famotidine, fluticasone propionate, hydroxyzine pamoate, norgestrel-ethinyl estradiol, ondansetron, and promethazine.    Allergies:   Review of patient's allergies indicates:  No Known Allergies     Objective      Observation: pt presents in gym. No distress noted. No bruising or deformity noted     Gait: independent, no significant deviations noted     Functional tests(*=pain):   DL squat: antalgic, non functional, hip shift to RLE  SL squat: antalgic non functional, dynamic valgus curtis, limited depth  SLS EO: no LOB  SLS EC: no LOB  Eccentric step down: n/t  DL jump: n/t  SL hop: n/t     Range of Motion(*=pain):   Knee AROM PROM   Right 5-0-140 5-0-140   Left 5-0-140 5-0-140      Lower Extremity Strength: HHD lbs  Right LE   Left LE     Quadriceps: 44.9 Quadriceps: 40.3   Hamstrings:  30 deg     60 deg     90 deg    43.3 (0/10)     34.6 (1/10 cramp)     31.4 (2/10 cramp) Hamstrings:  30 deg     60 deg     90 deg    30.2 (4/10)     25.8 (0/10)     29.3 (0/10)      Special Tests:    Right Left   Valgus Stress Test N N   Varus Stress test N N   Lachman's test N N   Posterior Lachman N N    Rubina's Test N N   Thessaly's Test N N   Patellar Grind Test N N      Joint Mobility: no deviations noted      Palpation: mild tenderness to mid muscle belly of medial and lateral hamstring curtis     Proximal/distal screen: full ankle ROM, full      Sensation: intact BLE     Flexibility:               Ely's test: R = night ; L = tight               Hamstrings: R = 20 deg ; L = 20 deg               Teri's test: R = n/t ; L = n/t              Nick test: R = tight ; L = tight          Limitation/Restriction for FOTO  Survey    Therapist reviewed FOTO scores for Yahaira Darling on 9/10/2024.   FOTO documents entered into [employer] - see Media section.    Limitation Score: see media         Treatment     Total Treatment time (time-based codes) separate from Evaluation: 30 minutes      Dawit received the treatments listed below:      therapeutic exercises to develop strength and ROM for 30 minutes including:  Issued and reviewed progression and regressions of hamstring sliders. Pt took video. Will work this into conditioning program      Patient Education and Home Exercises     Education provided:   - issued hep    Written Home Exercises Provided: yes. Exercises were reviewed and Dawit was able to demonstrate them prior to the end of the session.  Dawit demonstrated good  understanding of the education provided. See EMR under Patient Instructions for exercises provided during therapy sessions.    Assessment     Yahaira West is a 24 y.o. female referred to outpatient Physical Therapy with a medical diagnosis of   M25.512 (ICD-10-CM) - Left shoulder pain, unspecified chronicity   S76.302A (ICD-10-CM) - Hamstring injury, left, initial encounter   S76.301A (ICD-10-CM) - Hamstring injury, right, initial encounter   Patient presents with proximal medial hamstirng pain with palpation and loading. This is overall better from when I last saw her in March. Torque output is improved with less pain.  Some of her pain is cramping. Output is still low and will need to continue to progress conditioning before season starts. No neural tension issues noted at this time, no joint dysfunctions noted. She does have some calf weakness that can be addressed in conditioning program. Will manage in clinic as well as with ATC and SC . Pt would benefit from skilled PT in order to maximize function.     Patient prognosis is Excellent.   Patient will benefit from skilled outpatient Physical Therapy to address the deficits stated above and in the chart below, provide patient /family education, and to maximize patientt's level of independence.     Plan of care discussed with patient: Yes  Patient's spiritual, cultural and educational needs considered and patient is agreeable to the plan of care and goals as stated below:     Anticipated Barriers for therapy: none    Medical Necessity is demonstrated by the following  History  Co-morbidities and personal factors that may impact the plan of care [x] LOW: no personal factors / co-morbidities  [] MODERATE: 1-2 personal factors / co-morbidities  [] HIGH: 3+ personal factors / co-morbidities     Moderate / High Support Documentation:   Co-morbidities affecting plan of care: NA     Personal Factors:   no deficits      Examination  Body Structures and Functions, activity limitations and participation restrictions that may impact the plan of care [x] LOW: addressing 1-2 elements  [] MODERATE: 3+ elements  [] HIGH: 4+ elements (please support below)     Moderate / High Support Documentation: NA      Clinical Presentation [x] LOW: stable  [] MODERATE: Evolving  [] HIGH: Unstable      Decision Making/ Complexity Score: low       Goals:  Short Term Goals: 6-8 weeks   Pt independent in hep  Pain 0-1/10, pt able to manage with help of ATC  Increased torque output by 50% of current  Pt shows proper form and loading in preseason training exercises     Long Term Goals: 16-30 weeks   Pt  independent in d/c hep  0/10 pain  Isometric torque 100 % LSI and in standard for her bodyweight  Isokinetic testing 100% LSI and in standard for her bodyweight  Pt returns to jogging and progresses through return to sprinting program, able to sprint at 100%    Plan     Plan of care Certification: 9/10/2024 to 12/31/24.    Outpatient Physical Therapy 1-3 times weekly for 16 weeks to include the following interventions: Electrical Stimulation DN, Manual Therapy, Moist Heat/ Ice, Neuromuscular Re-ed, Patient Education, Therapeutic Activities, and Therapeutic Exercise.     Adelso Hdez, PT

## 2024-09-12 NOTE — PLAN OF CARE
[facility]   Physical Therapy Initial Evaluation      Name: Yahaira Darling  Clinic Number: [id number]    Therapy Diagnosis:   Encounter Diagnoses   Name Primary?    Left shoulder pain, unspecified chronicity     Hamstring injury, left, initial encounter     Hamstring injury, right, initial encounter     Pain in both lower extremities Yes    Weakness         Physician: Mary Kate Monaco DO    Physician Orders: PT Eval and Treat   Medical Diagnosis from Referral:   M25.512 (ICD-10-CM) - Left shoulder pain, unspecified chronicity   S76.302A (ICD-10-CM) - Hamstring injury, left, initial encounter   S76.301A (ICD-10-CM) - Hamstring injury, right, initial encounter     Evaluation Date: 9/10/2024  Authorization Period Expiration: 8/23/24  Plan of Care Expiration: 12/31/24  Progress Note Due: 9/31/24  Visit # / Visits authorized: 1/ 1   FOTO: 1/1    Precautions: Standard     Time In: 1400  Time Out: 1500  Total Appointment Time (timed & untimed codes): 60 minutes    Subjective     Date of onset: chronic    History of current condition - Dawit reports: 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 She had had this pain off and on since high school and has not every full improved. Sprinter. Seen by me in May of this year to which hamstrings were severely irritable. States she has rested since end of season and they are better but not fully improved. Currently in preseason S&C. Has some pain with performing power cleans, closed chain hamstring work. Denies N/T, radiating pain. Denies LBP, hip pain. Currently in season which ends in May.     Falls: none    Imaging: see jayda:     Prior Therapy: PT one visit [place]  Social History:  lives with their family  Occupation: student athlete  Prior Level of Function: sprinter, pain in hamstrings, unable to perform at 100%  Current Level of Function: improved from March but continues  to have pain and weakness    Pain:  Current 0/10, worst 5/10, best 0/10   Location: curtis hamstring    Description: dull, ache, cramp  Aggravating Factors: sprinting, power clean, single leg hamstring work  Easing Factors: rest    Patients goals: return to sprinting 100%     Medical History:   Past Medical History:   Diagnosis Date    ADHD (attention deficit hyperactivity disorder)     Allergy     Anxiety     Asthma     Depression     Psychiatric problem     Therapy        Surgical History:   Yahaira Darling  has a past surgical history that includes Examination under anesthesia (N/A, 8/4/2020); Rotator cuff repair; and Esophagogastroduodenoscopy (N/A, 8/8/2024).    Medications:   Yahaira West has a current medication list which includes the following prescription(s): albuterol, cetirizine, dextroamphetamine-amphetamine, famotidine, fluticasone propionate, hydroxyzine pamoate, norgestrel-ethinyl estradiol, ondansetron, and promethazine.    Allergies:   Review of patient's allergies indicates:  No Known Allergies     Objective      Observation: pt presents in gym. No distress noted. No bruising or deformity noted     Gait: independent, no significant deviations noted     Functional tests(*=pain):   DL squat: antalgic, non functional, hip shift to RLE  SL squat: antalgic non functional, dynamic valgus curtis, limited depth  SLS EO: no LOB  SLS EC: no LOB  Eccentric step down: n/t  DL jump: n/t  SL hop: n/t     Range of Motion(*=pain):   Knee AROM PROM   Right 5-0-140 5-0-140   Left 5-0-140 5-0-140      Lower Extremity Strength: HHD lbs  Right LE   Left LE     Quadriceps: 44.9 Quadriceps: 40.3   Hamstrings:  30 deg     60 deg     90 deg    43.3 (0/10)     34.6 (1/10 cramp)     31.4 (2/10 cramp) Hamstrings:  30 deg     60 deg     90 deg    30.2 (4/10)     25.8 (0/10)     29.3 (0/10)      Special Tests:    Right Left   Valgus Stress Test N N   Varus Stress test N N   Lachman's test N N   Posterior Lachman N N    Rubina's Test N N   Thessaly's Test N N   Patellar Grind Test N N      Joint Mobility: no deviations noted      Palpation: mild tenderness to mid muscle belly of medial and lateral hamstring curtis     Proximal/distal screen: full ankle ROM, full      Sensation: intact BLE     Flexibility:               Ely's test: R = night ; L = tight               Hamstrings: R = 20 deg ; L = 20 deg               Teri's test: R = n/t ; L = n/t              Nick test: R = tight ; L = tight          Limitation/Restriction for FOTO  Survey    Therapist reviewed FOTO scores for Yahaira Darling on 9/10/2024.   FOTO documents entered into [email or web address] - see Media section.    Limitation Score: see media         Treatment     Total Treatment time (time-based codes) separate from Evaluation: 30 minutes      Dawit received the treatments listed below:      therapeutic exercises to develop strength and ROM for 30 minutes including:  Issued and reviewed progression and regressions of hamstring sliders. Pt took video. Will work this into conditioning program      Patient Education and Home Exercises     Education provided:   - issued hep    Written Home Exercises Provided: yes. Exercises were reviewed and Dawit was able to demonstrate them prior to the end of the session.  Dawit demonstrated good  understanding of the education provided. See EMR under Patient Instructions for exercises provided during therapy sessions.    Assessment     Yahaira West is a 24 y.o. female referred to outpatient Physical Therapy with a medical diagnosis of   M25.512 (ICD-10-CM) - Left shoulder pain, unspecified chronicity   S76.302A (ICD-10-CM) - Hamstring injury, left, initial encounter   S76.301A (ICD-10-CM) - Hamstring injury, right, initial encounter   Patient presents with proximal medial hamstirng pain with palpation and loading. This is overall better from when I last saw her in March. Torque output is improved with less pain.  Some of her pain is cramping. Output is still low and will need to continue to progress conditioning before season starts. No neural tension issues noted at this time, no joint dysfunctions noted. She does have some calf weakness that can be addressed in conditioning program. Will manage in clinic as well as with ATC and SC . Pt would benefit from skilled PT in order to maximize function.     Patient prognosis is Excellent.   Patient will benefit from skilled outpatient Physical Therapy to address the deficits stated above and in the chart below, provide patient /family education, and to maximize patientt's level of independence.     Plan of care discussed with patient: Yes  Patient's spiritual, cultural and educational needs considered and patient is agreeable to the plan of care and goals as stated below:     Anticipated Barriers for therapy: none    Medical Necessity is demonstrated by the following  History  Co-morbidities and personal factors that may impact the plan of care [x] LOW: no personal factors / co-morbidities  [] MODERATE: 1-2 personal factors / co-morbidities  [] HIGH: 3+ personal factors / co-morbidities     Moderate / High Support Documentation:   Co-morbidities affecting plan of care: NA     Personal Factors:   no deficits      Examination  Body Structures and Functions, activity limitations and participation restrictions that may impact the plan of care [x] LOW: addressing 1-2 elements  [] MODERATE: 3+ elements  [] HIGH: 4+ elements (please support below)     Moderate / High Support Documentation: NA      Clinical Presentation [x] LOW: stable  [] MODERATE: Evolving  [] HIGH: Unstable      Decision Making/ Complexity Score: low       Goals:  Short Term Goals: 6-8 weeks   Pt independent in hep  Pain 0-1/10, pt able to manage with help of ATC  Increased torque output by 50% of current  Pt shows proper form and loading in preseason training exercises     Long Term Goals: 16-30 weeks   Pt  independent in d/c hep  0/10 pain  Isometric torque 100 % LSI and in standard for her bodyweight  Isokinetic testing 100% LSI and in standard for her bodyweight  Pt returns to jogging and progresses through return to sprinting program, able to sprint at 100%    Plan     Plan of care Certification: 9/10/2024 to 12/31/24.    Outpatient Physical Therapy 1-3 times weekly for 16 weeks to include the following interventions: Electrical Stimulation DN, Manual Therapy, Moist Heat/ Ice, Neuromuscular Re-ed, Patient Education, Therapeutic Activities, and Therapeutic Exercise.     Adelso Hdez, PT

## 2024-09-14 ENCOUNTER — HOSPITAL ENCOUNTER (EMERGENCY)
Facility: OTHER | Age: 24
Discharge: HOME | End: 2024-09-14
Attending: EMERGENCY MEDICINE
Payer: COMMERCIAL

## 2024-09-14 VITALS
HEART RATE: 60 BPM | WEIGHT: 135 LBS | DIASTOLIC BLOOD PRESSURE: 62 MMHG | OXYGEN SATURATION: 100 % | TEMPERATURE: 98 F | SYSTOLIC BLOOD PRESSURE: 100 MMHG | BODY MASS INDEX: 22.47 KG/M2 | RESPIRATION RATE: 18 BRPM

## 2024-09-14 DIAGNOSIS — T14.90XA INJURY: ICD-10-CM

## 2024-09-14 DIAGNOSIS — M25.512 ACUTE PAIN OF LEFT SHOULDER: Primary | ICD-10-CM

## 2024-09-14 LAB
B-HCG UR QL: NEGATIVE
CTP QC/QA: YES

## 2024-09-14 PROCEDURE — 63600175 PHARM REV CODE 636 W HCPCS: Performed by: NURSE PRACTITIONER

## 2024-09-14 PROCEDURE — 96372 THER/PROPH/DIAG INJ SC/IM: CPT | Performed by: NURSE PRACTITIONER

## 2024-09-14 PROCEDURE — 25000003 PHARM REV CODE 250: Performed by: NURSE PRACTITIONER

## 2024-09-14 PROCEDURE — 99284 EMERGENCY DEPT VISIT MOD MDM: CPT | Mod: 25

## 2024-09-14 PROCEDURE — 81025 URINE PREGNANCY TEST: CPT | Performed by: EMERGENCY MEDICINE

## 2024-09-14 RX ORDER — KETOROLAC TROMETHAMINE 10 MG/1
10 TABLET, FILM COATED ORAL EVERY 6 HOURS
Qty: 15 TABLET | Refills: 0 | Status: SHIPPED | OUTPATIENT
Start: 2024-09-14

## 2024-09-14 RX ORDER — METHOCARBAMOL 500 MG/1
1000 TABLET, FILM COATED ORAL 3 TIMES DAILY
Qty: 30 TABLET | Refills: 0 | Status: SHIPPED | OUTPATIENT
Start: 2024-09-14 | End: 2024-09-19

## 2024-09-14 RX ORDER — ORPHENADRINE CITRATE 100 MG/1
100 TABLET, EXTENDED RELEASE ORAL
Status: COMPLETED | OUTPATIENT
Start: 2024-09-14 | End: 2024-09-14

## 2024-09-14 RX ORDER — KETOROLAC TROMETHAMINE 30 MG/ML
15 INJECTION, SOLUTION INTRAMUSCULAR; INTRAVENOUS
Status: COMPLETED | OUTPATIENT
Start: 2024-09-14 | End: 2024-09-14

## 2024-09-14 RX ADMIN — KETOROLAC TROMETHAMINE 15 MG: 30 INJECTION, SOLUTION INTRAMUSCULAR; INTRAVENOUS at 09:09

## 2024-09-14 RX ADMIN — ORPHENADRINE CITRATE 100 MG: 100 TABLET, EXTENDED RELEASE ORAL at 09:09

## 2024-09-14 NOTE — ED TRIAGE NOTES
Pt arrived to ED complaining of L shoulder pain after it came out of socket, no obvious deformity at this time and pt states shoulder is back in place.

## 2024-09-14 NOTE — ED PROVIDER NOTES
Source of History:  Patient, Relative    Chief complaint:  Shoulder Injury (Pt reports left shoulder popped out of socket in sleep, has happened before. )      HPI:  Yahaira Darling is a 24 y.o. female presenting to the emergency department with recurrent left shoulder dislocation, onset today. Patient reports that this morning, upon waking, she experienced an episode where her left shoulder popped out of the socket. She states that her shoulder dislocations tend to shift back into place by itself after some time. However, she notes that her shoulder did not realign this time which prompted her to come to emergency department.  She states when she got to the ED and sat in a wheelchair she felt a pop back into place.   Accompanied symptoms include significant pain and swelling in the left shoulder. She recalls that the initial dislocation occurred when someone pulled on her arm in an attempt to move her out of the way. The patient has a history of right shoulder surgery and has recently resumed physical therapy,    PMH:  As per HPI and below:  Past Medical History:   Diagnosis Date    ADHD (attention deficit hyperactivity disorder)     Allergy     Anxiety     Asthma     Depression     Psychiatric problem     Therapy      Past Surgical History:   Procedure Laterality Date    ESOPHAGOGASTRODUODENOSCOPY N/A 2024    Procedure: EGD (ESOPHAGOGASTRODUODENOSCOPY);  Surgeon: Kenneth Chaudhari MD;  Location: [facility];  Service: Endoscopy;  Laterality: N/A;  referral dr chaudhari/ vic instructions given in clinic / Procedure Timin-12 weeks   precall complete-ml    EXAMINATION UNDER ANESTHESIA N/A 2020    Procedure: EXAM UNDER ANESTHESIA;  Surgeon: Delfina Surgeon;  Location[facility];  Service: Anesthesiology;  Laterality: N/A;    ROTATOR CUFF REPAIR         Social History     Tobacco Use    Smoking status: Some Days    Smokeless tobacco: Never   Substance Use Topics    Alcohol use: Yes     Comment: socially    Drug  use: Yes     Types: Marijuana       Review of patient's allergies indicates:  No Known Allergies    ROS: As per HPI and below:  General: No fever.  No chills.  Eyes: No visual changes.   ENT: No sore throat. No ear pain.  Urinary: No abnormal urination.  MSK: Positive for arthralgias and myalgias. Positive for joint swelling.  Integument:  No rashes or lesions.       Physical Exam:    /62 (BP Location: Right arm, Patient Position: Sitting)   Pulse 60   Temp 98.2 °F (36.8 °C) (Oral)   Resp 18   Wt 61.2 kg (135 lb)   SpO2 100%   Breastfeeding No   BMI 22.47 kg/m²   Vitals:    09/14/24 0845 09/14/24 1015   BP: (!) 98/59 100/62   Pulse: 62 60   Resp: 20 18   Temp: 98.2 °F (36.8 °C) 98.2 °F (36.8 °C)   TempSrc: Oral Oral   SpO2: 100% 100%   Weight: 61.2 kg (135 lb)        Nursing note and vital signs reviewed.  Appearance: No acute distress.  Eyes: No conjunctival injection.  Extraocular muscles are intact.  ENT: Normal phonation.  Cardio:  Radial pulse +2 bilaterally.  Musculoskeletal:  No deformity of the left shoulder, able to touch contralateral shoulder.  Pain elicited with any movement.   is equal bilaterally.  Skin: No rashes seen.  Good turgor.  No abrasions.  No ecchymoses.  Neuro: Normal strength.  equal.    Mental Status:  Alert and oriented x 3.  Appropriate, conversant.    Abnormal Labs Reviewed - No abnormal labs to display    X-Ray Shoulder Trauma 3 view Left   Final Result      As above         Electronically signed by: Jersey Tyler MD   Date:    09/14/2024   Time:    09:15            Initial Impression/ Differential Dx:  Differential Diagnosis includes, but is not limited to:  Shoulder dislocation, fracture, AC separation, compartment syndrome, nerve injury/palsy, impingement syndrome, hemarthrosis, septic joint, bursitis, rotator cuff injury, tendonitis, muscle strain, ligament tear/sprain, abrasion, soft tissue contusion, osteoarthritis.    Medical Decision Making  24-year-old  female with repeated shoulder dislocations to the right shoulder presents with a dislocation of her left shoulder that occurred this morning, states when upon arrival to emergency department if popped back into place.  X-ray negative for any acute fractures.  I discussed with the patient as she was repeated dislocation she likely needs physical therapy.  Patient also he was already established with the orthopedic surgeon.  There is a concern for possible hypermobility syndrome.  Provided contacted formation for a hypermobility specialist [facility].      Amount and/or Complexity of Data Reviewed  Labs: ordered.  Radiology: ordered.    Risk  Prescription drug management.         MDM:         Diagnostic Impression:    1. Acute pain of left shoulder    2. Injury         ED Disposition Condition    Discharge Stable            ED Prescriptions       Medication Sig Dispense Start Date End Date Auth. Provider    ketorolac (TORADOL) 10 mg tablet Take 1 tablet (10 mg total) by mouth every 6 (six) hours. 15 tablet 9/14/2024 -- Mary Olivera FNP    methocarbamoL (ROBAXIN) 500 MG Tab Take 2 tablets (1,000 mg total) by mouth 3 (three) times daily. for 5 days 30 tablet 9/14/2024 9/19/2024 Mary Olivera FNP          Follow-up Information       Follow up With Specialties Details Why Contact Info    Bang Pozo MD  Call in 2 days  [phone]    [facility] [place] [facility] Emergency Medicine Go to  If symptoms worsen [address] [facility] [id number] [phone]          IAlcira, scribed for, and in the presence of, Mary Olivera FNP . I performed the scribed service and the documentation accurately describes the services I performed. I attest to the accuracy of the note.     Provider Attestation for Scribe: I, RIZWANA Troy, reviewed documentation as scribed in my presence, which is both accurate and complete.       Mary Olivera FNP  09/15/24 2049

## 2024-12-02 ENCOUNTER — OFFICE VISIT (OUTPATIENT)
Dept: URGENT CARE | Facility: CLINIC | Age: 24
End: 2024-12-02
Payer: COMMERCIAL

## 2024-12-02 VITALS
BODY MASS INDEX: 22.49 KG/M2 | WEIGHT: 135 LBS | SYSTOLIC BLOOD PRESSURE: 126 MMHG | HEART RATE: 73 BPM | TEMPERATURE: 98 F | DIASTOLIC BLOOD PRESSURE: 82 MMHG | RESPIRATION RATE: 20 BRPM | HEIGHT: 65 IN | OXYGEN SATURATION: 98 %

## 2024-12-02 DIAGNOSIS — L50.9 URTICARIA: Primary | ICD-10-CM

## 2024-12-02 PROCEDURE — 96372 THER/PROPH/DIAG INJ SC/IM: CPT | Mod: S$GLB,,, | Performed by: FAMILY MEDICINE

## 2024-12-02 PROCEDURE — 99213 OFFICE O/P EST LOW 20 MIN: CPT | Mod: 25,S$GLB,, | Performed by: FAMILY MEDICINE

## 2024-12-02 RX ORDER — DEXAMETHASONE SODIUM PHOSPHATE 10 MG/ML
10 INJECTION INTRAMUSCULAR; INTRAVENOUS
Status: COMPLETED | OUTPATIENT
Start: 2024-12-02 | End: 2024-12-02

## 2024-12-02 RX ADMIN — DEXAMETHASONE SODIUM PHOSPHATE 10 MG: 10 INJECTION INTRAMUSCULAR; INTRAVENOUS at 04:12

## 2024-12-02 NOTE — PATIENT INSTRUCTIONS
You received a steroid shot today  -this can elevate your blood pressure, elevate blood sugar, cause water weight gain, nervous energy, redness to the face and dimpling of the skin where the shot was administered.     Take Zyrtec once or twice daily as tolerated    I have placed allergy referral  Call to schedule an appointment: [email or web address]

## 2024-12-02 NOTE — PROGRESS NOTES
"Subjective:      Patient ID: Yahaira Darling is a 24 y.o. female.    Vitals:  height is 5' 5" (1.651 m) and weight is 61.2 kg (135 lb). Her temperature is 98.3 °F (36.8 °C). Her blood pressure is 126/82 and her pulse is 73. Her respiration is 20 and oxygen saturation is 98%.     Chief Complaint: Rash    Pt presents with complaint of recurrent hive outbreaks over 2 weeks on arms, legs, chest and face; every 2 days lasting for about 3-4 hours.  Pt states she is exposed to mold in her dorm.  Pt states she has taken benadryl and applied hydrocortisone topically. She has over the counter Zyrtec.  At present she does not have any rash/ outbreaks.     Rash  This is a new problem. The current episode started 1 to 4 weeks ago. The problem has been waxing and waning since onset. The rash is diffuse. The rash is characterized by swelling and itchiness. Pertinent negatives include no anorexia, congestion, cough, diarrhea, eye pain, fatigue, fever, joint pain, nail changes, shortness of breath, sore throat or vomiting. Treatments tried: benadryl, hydrocortisone topical. The treatment provided significant relief.       Constitution: Negative for fatigue and fever.   HENT:  Negative for congestion and sore throat.    Eyes:  Negative for eye pain.   Respiratory:  Negative for cough and shortness of breath.    Gastrointestinal:  Negative for vomiting and diarrhea.   Skin:  Positive for rash.      Objective:     Vitals:    12/02/24 1530   BP: 126/82   Pulse: 73   Resp: 20   Temp: 98.3 °F (36.8 °C)   SpO2: 98%   Weight: 61.2 kg (135 lb)   Height: 5' 5" (1.651 m)      Physical Exam   Constitutional: She is oriented to person, place, and time.  Non-toxic appearance. She does not appear ill. No distress.   HENT:   Head: Atraumatic.   Eyes: Conjunctivae are normal.   Pulmonary/Chest: Effort normal and breath sounds normal.   Neurological: She is alert and oriented to person, place, and time.   Skin: Skin is not diaphoretic and no " rash.   Psychiatric: Judgment and thought content normal.       Assessment:     1. Urticaria        Plan:       Urticaria  -     dexAMETHasone injection 10 mg  -     Ambulatory referral/consult to Allergy    Patient Instructions   You received a steroid shot today  -this can elevate your blood pressure, elevate blood sugar, cause water weight gain, nervous energy, redness to the face and dimpling of the skin where the shot was administered.     Take Zyrtec once or twice daily as tolerated    I have placed allergy referral  Call to schedule an appointment: [email or web address]

## 2024-12-02 NOTE — LETTER
December 2, 2024      [facility] [address] [facility] [address]  Phone: [phone]  Fax: [phone]       Patient: Yahaira Darling   YOB: 2000  Date of Visit: 12/02/2024    To Whom It May Concern:    Colleen Darling  was at [facility] on 12/02/2024 with reports of recurrent urticaria likely secondary to environmental trigger. She will benefit from residence in an area without mold. Please accommodate patient transfer to another dormitory.  Your understanding will be greatly appreciated.    Sincerely,     Yeimi Monk MD

## 2024-12-03 ENCOUNTER — PATIENT MESSAGE (OUTPATIENT)
Dept: DERMATOLOGY | Facility: CLINIC | Age: 24
End: 2024-12-03
Payer: COMMERCIAL

## 2024-12-22 ENCOUNTER — ATHLETIC TRAINING SESSION (OUTPATIENT)
Dept: SPORTS MEDICINE | Facility: CLINIC | Age: 24
End: 2024-12-22
Payer: COMMERCIAL

## 2024-12-22 DIAGNOSIS — M79.10 MUSCLE SORENESS: Primary | ICD-10-CM

## 2024-12-22 NOTE — PROGRESS NOTES
Reason for Encounter N/A    Subjective:     Chief Complaint: Yahaira Darling is a 24 y.o. 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 ([employer]) who had concerns including Health Maintenance of the Left Shoulder.    Handedness: right-handed  Sport played: track & field      Level: college      Position:sprints        Assessment:     Status: F - Full Participation    Date Seen:  12/16/2024 - 12/20/2024    Date of Injury:  n/a    Date Out:  n/a    Date Cleared:  n/a    Treatment/Rehab/Maintenance:     DATE OF SERVICE: 12/18/2024  INJURY/CONDITON: Sore Left Shoulder     MODALITIES:    MISCELLANEOUS:       []Active Release   []Compression Wrap   []Cupping    []Support Wrap  []E-Stim- Compex   []Taping  []E-Stim- IFC    []Foam Roller  []E-Stim- Premod   []Cold Tub  []Joint Mobilization   []Contrast Tub  []Manual Therapy   []Hot Pack  []Massage    []Hot Tub  []Massage - Scar Tissue  []Ice Cup  []Myofascial Release   [x]Ice Pack  []Flossing/BFR   []GameReady  []Ultrasound  []Ultrasound - Phonophoresis  []Instrumental Assisted Soft Tissue Mobilization (IASTM)  []Intermittent Compression - Normatec  []Massage Gun  []ROM - Active  []ROM - Passive  []Stretching - Active  []Stretching - Dynamic  []Stretching - Passive  []Stretching - PNF  []Stretching - Static  []Mobility Work - Hip/Back  []Mobility Work - Ankle  []Mobility Work - Shoulder    Plan:     1. Report to ATR as needed for recovery/maintenance  2. Physician Referral: no  3. ED Referral:no  4. Parent/Guardian Notified: No  5. All questions were answered, ath. will contact me for questions or concerns in  the interim.  6.         Eligible to use School Insurance: Yes

## 2025-01-02 ENCOUNTER — LAB VISIT (OUTPATIENT)
Dept: LAB | Facility: HOSPITAL | Age: 25
End: 2025-01-02
Payer: COMMERCIAL

## 2025-01-02 ENCOUNTER — OFFICE VISIT (OUTPATIENT)
Dept: ALLERGY | Facility: CLINIC | Age: 25
End: 2025-01-02
Payer: COMMERCIAL

## 2025-01-02 VITALS — WEIGHT: 144.19 LBS | HEIGHT: 65 IN | BODY MASS INDEX: 24.02 KG/M2

## 2025-01-02 DIAGNOSIS — J45.909 ASTHMA, UNSPECIFIED ASTHMA SEVERITY, UNSPECIFIED WHETHER COMPLICATED, UNSPECIFIED WHETHER PERSISTENT: ICD-10-CM

## 2025-01-02 DIAGNOSIS — L50.1 CHRONIC IDIOPATHIC URTICARIA: Primary | ICD-10-CM

## 2025-01-02 DIAGNOSIS — J31.0 CHRONIC RHINITIS: ICD-10-CM

## 2025-01-02 DIAGNOSIS — T78.3XXA ANGIOEDEMA, INITIAL ENCOUNTER: ICD-10-CM

## 2025-01-02 PROCEDURE — 86003 ALLG SPEC IGE CRUDE XTRC EA: CPT | Mod: 59 | Performed by: STUDENT IN AN ORGANIZED HEALTH CARE EDUCATION/TRAINING PROGRAM

## 2025-01-02 PROCEDURE — 86003 ALLG SPEC IGE CRUDE XTRC EA: CPT | Performed by: STUDENT IN AN ORGANIZED HEALTH CARE EDUCATION/TRAINING PROGRAM

## 2025-01-02 PROCEDURE — 99999 PR PBB SHADOW E&M-EST. PATIENT-LVL III: CPT | Mod: PBBFAC,,, | Performed by: STUDENT IN AN ORGANIZED HEALTH CARE EDUCATION/TRAINING PROGRAM

## 2025-01-02 PROCEDURE — 36415 COLL VENOUS BLD VENIPUNCTURE: CPT | Mod: PO | Performed by: STUDENT IN AN ORGANIZED HEALTH CARE EDUCATION/TRAINING PROGRAM

## 2025-01-02 NOTE — PROGRESS NOTES
ALLERGY & IMMUNOLOGY CLINIC - INITIAL CONSULTATION      HISTORY OF PRESENT ILLNESS     Patient ID: Yahaira Darling is a 24 y.o. female    CC: urticaria, angioedema, and rhinitis     HPI: Yahaira Darling is a 24 y.o. female with a history of asthma, presenting for urticaria/angioedema and rhinitis.   Patient was referred by Yeimi Monk MD (urgent care).     She thinks there might be mold in her derm. She says this started with rhinitis and epistaxis, and she she started waking up randomly with hives and swelling.   She says she has actually been getting urticaria since childhood, but worsened since November.   She says she wakes up with angioedema every day to some extent, and she gets hives twice per week.   The hives don't always happen in the dorm though.   Lesions are pruritic, not painful.  Individual urticarial lesions last less than 24 hours; they don't leave a alysha.  Exacerbating factors: she is concerned mold in her dorm might be a trigger. Maybe stress.  She also found that being around a cat once caused facial swelling.   Hasn't found alcohol or NSAIDs to be a trigger.     She says on christmas day (while at her family home, not her dorm), she had some soup. Felt nauseated and tired. Took a nap. She says she was in and out of sleep the whole day because she didn't feel well, just felt drained. She says she felt like she couldn't take a deep breath. She had hives (but this started hours after the food). She had diarrhea throughout the day. She says the next morning, she went to the bathroom one more time and she was then fine.   There was a sick contact in the house.     Other than the this illness on christmas, patient denies fevers, chills, night sweats, unexplained weight loss, blood in stool, melena, easy bruising or bleeding, unusual lumps or bumps, hot/cold intolerance, shortness of breath, wheezing, and cough.    She takes claritin in the morning and zyrtec at night (she has  been on this dosing since before the hives worsened). The zyrtec doesn't make her drowsy.   She sometimes takes benadryl prn, which helps with the hives but it makes her drowsy.  She uses cortisone prn.     She does have asthma. She says asthma symptoms were worsening, so she went to [place]. She is now on a controller inhaler (doesn't remember the name). Now needing albuterol about once per week.    She was waking up congested every morning. Worried about mold in her dorm room. Symptoms improve now that she is home for the holidays.  She has flonase, but doesn't use it.      MEDICAL HISTORY     Vaccines:   Immunization History   Administered Date(s) Administered    COVID-19 MRNA, LN-S PF (MODERNA HALF 0.25 ML DOSE) 02/10/2022    COVID-19, MRNA, LN-S, PF (MODERNA FULL 0.5 ML DOSE) 04/13/2021, 05/11/2021    DTaP 08/01/2006    DTaP (5 Pertussis Antigens) 2002, 2002, 01/14/2003, 09/27/2003    HIB 2000, 2002, 01/14/2003, 09/27/2003    HPV 9-Valent 10/20/2016, 10/17/2019    Hepatitis A, Pediatric/Adolescent, 2 Dose 10/17/2019    Hepatitis B 2002, 09/27/2003, 08/01/2006    IPV 2002, 2002, 09/27/2003, 08/01/2006    Influenza - Quadrivalent - PF *Preferred* (6 months and older) 10/17/2019, 10/27/2021    Influenza - Trivalent - Fluarix, Flulaval, Fluzone, Afluria - PF 10/20/2016    Influenza Whole 10/30/2003    MMR 09/27/2003, 08/01/2006    Meningococcal Conjugate (MCV4O) 2 Vial (2mo-55yr) 10/19/2013    Meningococcal Conjugate (MCV4P) 10/17/2019    Pneumococcal Conjugate - 7 Valent 2002, 01/14/2003, 09/27/2003    Tdap 10/19/2013, 03/17/2023    Varicella 09/27/2003, 01/12/2007     Medical Hx:   Patient Active Problem List   Diagnosis    ALLERGIC RHINITIS    Asthma, intermittent    Right shoulder pain    Status post repair of glenoid labrum    Major depressive disorder, single episode, in partial remission    Generalized anxiety disorder with panic attacks    ADHD  (attention deficit hyperactivity disorder), combined type    Pain in both lower extremities    Weakness     Surgical Hx:   Past Surgical History:   Procedure Laterality Date    ESOPHAGOGASTRODUODENOSCOPY N/A 2024    Procedure: EGD (ESOPHAGOGASTRODUODENOSCOPY);  Surgeon: Kenneth Chaudhari MD;  Location: [facility];  Service: Endoscopy;  Laterality: N/A;  referral dr chaudhari/ vic instructions given in clinic / Procedure Timin-12 weeks   precall complete-ml    EXAMINATION UNDER ANESTHESIA N/A 2020    Procedure: EXAM UNDER ANESTHESIA;  Surgeon: Delfina Surgeon;  Location: [facility];  Service: Anesthesiology;  Laterality: N/A;    ROTATOR CUFF REPAIR       Medications:   Current Outpatient Medications on File Prior to Visit   Medication Sig Dispense Refill    albuterol (PROVENTIL/VENTOLIN HFA) 90 mcg/actuation inhaler Inhale 2 puffs into the lungs every 6 (six) hours as needed for Wheezing or Shortness of Breath. 8 g 0    cetirizine (ZYRTEC) 10 MG tablet Take 10 mg by mouth once daily.      dextroamphetamine-amphetamine (ADDERALL) 5 mg Tab Take 1 tablet po q am and 1 tablet po at noon prn inattention 60 tablet 0    famotidine (PEPCID) 20 MG tablet Take 1 tablet (20 mg total) by mouth 2 (two) times daily. for 14 days 28 tablet 0    fluticasone propionate (FLONASE) 50 mcg/actuation nasal spray 1 spray (50 mcg total) by Each Nostril route 2 (two) times daily. 9.9 mL 0    hydrOXYzine pamoate (VISTARIL) 25 MG Cap Take 1 capsule (25 mg total) by mouth 4 (four) times daily as needed (anxiety). 20 capsule 0    ketorolac (TORADOL) 10 mg tablet Take 1 tablet (10 mg total) by mouth every 6 (six) hours. 15 tablet 0    ondansetron (ZOFRAN-ODT) 4 MG TbDL Take 1 tablet (4 mg total) by mouth every 6 (six) hours as needed (nausea). 20 tablet 0    promethazine (PHENERGAN) 12.5 MG Tab Take 1 tablet (12.5 mg total) by mouth 4 (four) times daily as needed (severe nausea/vomiting). 12 tablet 0    norgestrel-ethinyl estradioL (LO/OVRAL)  "0.3-30 mg-mcg per tablet Take 1 tablet by mouth once daily. (Patient not taking: Reported on 1/2/2025) 84 tablet 3     No current facility-administered medications on file prior to visit.     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 Lives in the dorms, which she suspects has mold. She runs track.     Social Hx:   Social History     Tobacco Use    Smoking status: Some Days     Passive exposure: Current    Smokeless tobacco: Never   Substance Use Topics    Alcohol use: Yes     Comment: socially    Drug use: Yes     Types: Marijuana     Family Hx:   Family History   Problem Relation Name Age of Onset    Asthma Mother      Eczema Mother      Allergies Mother      Hypertension Mother      Allergies Father      ADD / ADHD Sister      Hypertension Maternal Grandmother      Drug abuse Maternal Grandmother      Hypertension Maternal Grandfather      Drug abuse Maternal Grandfather      Hypertension Paternal Grandmother      Drug abuse Paternal Grandmother      Hypertension Paternal Grandfather      Drug abuse Paternal Grandfather      Breast cancer Neg Hx      Colon cancer Neg Hx      Ovarian cancer Neg Hx      Esophageal cancer Neg Hx        PHYSICAL EXAM     VS: Ht 5' 5" (1.651 m)   Wt 65.4 kg (144 lb 2.9 oz)   BMI 23.99 kg/m²   GENERAL: Alert, NAD, well-appearing  EYES: EOMI, no conjunctival injection, no discharge, no infraorbital shiners  NOSE: NT 2+ B/L, no stringing mucus, no polyps visualized  ORAL: MMM, no ulcers, no thrush  LUNGS: CTAB, no w/r/c, no increased WOB  HEART: RRR, normal S1/S2, no m/g/r  DERM: no rashes  NEURO: normal speech, normal gait, no facial asymmetry     LABORATORY STUDIES     Component      Latest Ref Rn 6/1/2024   WBC      3.90 - 12.70 K/uL 5.99    RBC      4.00 - 5.40 M/uL 4.39    Hemoglobin      12.0 - 16.0 g/dL 13.8    Hematocrit      " 37.0 - 48.5 % 40.4    MCV      82 - 98 fL 92    MCH      27.0 - 31.0 pg 31.4 (H)    MCHC      32.0 - 36.0 g/dL 34.2    RDW      11.5 - 14.5 % 12.6    Platelet Count      150 - 450 K/uL 193    MPV      9.2 - 12.9 fL 11.3    Immature Granulocytes      0.0 - 0.5 % 0.2    Gran # (ANC)      1.8 - 7.7 K/uL 3.8    Immature Grans (Abs)      0.00 - 0.04 K/uL 0.01    Lymph #      1.0 - 4.8 K/uL 1.7    Mono #      0.3 - 1.0 K/uL 0.4    Eos #      0.0 - 0.5 K/uL 0.1    Baso #      0.00 - 0.20 K/uL 0.01    Differential Method Automated    Sodium      136 - 145 mmol/L 138    Potassium      3.5 - 5.1 mmol/L 4.1    Chloride      95 - 110 mmol/L 104    CO2      23 - 29 mmol/L 22 (L)    Glucose      70 - 110 mg/dL 85    BUN      6 - 20 mg/dL 13    Creatinine      0.5 - 1.4 mg/dL 1.2    Calcium      8.7 - 10.5 mg/dL 9.6    PROTEIN TOTAL      6.0 - 8.4 g/dL 8.0    Albumin      3.5 - 5.2 g/dL 4.4    BILIRUBIN TOTAL      0.1 - 1.0 mg/dL 1.2 (H)    ALP      55 - 135 U/L 45 (L)    AST      10 - 40 U/L 25    ALT      10 - 44 U/L 8 (L)       ALLERGEN TESTING     Immunocaps: Ordered.      CHART REVIEW     Reviewed urgent care note, labs.      ASSESSMENT & PLAN     Tea Brett Darling is a 24 y.o. female with     # Chronic spontaneous urticaria and angioedema: Symptoms since childhood, but worsened since 11/2024. She suspects mold in dorm might be triggering symptoms, as she notices facial swelling worse when she wakes up in her dorm room. But she also gets urticaria when away from her dorm (at least twice per week). So symptoms most consistent with CSU, but possibly with environmental allergy as contributing factor. Counseled that the history and pattern of symptoms aren't consistent with an allergic reaction to food, medication, or skin product. Counseled on the natural course of CSU.   -instead of loratadine 10 mg qAM and cetirizine 10 mg qPM, recommend she switch over completely to cetirizine (as it may be more effective than  loratadine). And recommend she increase the dose of cetirizine up to 20 mg BID.    # Chronic rhinitis: She notices congestion worse in the morning (particularly when she is living in her dorms). She thinks mold might be a trigger.  -immunocaps for aeroallergens ordered, including mold.  -cetirizine as above.  -if symptoms worsen when she goes back to the dorm, recommend she start her flonase, 1-2 SEN BID.    # Asthma: She reports her asthma symptoms worsened this past semester, for which she got care through [employer]. Now on a controller inhaler (she doesn't remember which one), and symptoms improved. Requiring albuterol about once per week. Of note, she runs track, which can be a trigger for her asthma.  -as symptoms well controlled, recommend she continue her current management.       Follow up: 1 month    I spent a total of 55 minutes on the day of the visit.  This includes face to face time and non-face to face time preparing to see the patient (eg, review of tests), obtaining and/or reviewing separately obtained history, documenting clinical information in the electronic or other health record.    Betsy Ashley MD  Allergy/Immunology

## 2025-01-02 NOTE — PATIENT INSTRUCTIONS
For your hives/swelling:   The diagnosis is chronic spontaneous urticaria/angioedema. The first-line treatment for this is high dose antihistamines. I typically recommend using zyrtec (cetirizine, 10 mg tablets), xyzal (levocetirizine, 5 mg tablets), claritin (loratadine, 10 mg tablets), or allegra (fexofenadine, 180 mg tablets) up to 2 tablets twice daily. You can adjust to the lowest dose that controls your symptoms. Cetirizine and levocetirizine are likely the most effective, but if you find it sedating, you could try loratadine. Fexofenadine has the least potential to cause drowsiness, but might be the least effective. The generic versions of these medications can be bought in bulk for reasonable prices at [employer], [employer], [employer], or [employer].    For your nasal symptoms (such as congestion):  If symptoms worsen when you go back to the dorms, I recommend starting flonase (fluticasone nasal spray) 1-2 sprays in each nostril, up to twice daily.

## 2025-01-06 ENCOUNTER — PATIENT MESSAGE (OUTPATIENT)
Dept: ALLERGY | Facility: CLINIC | Age: 25
End: 2025-01-06
Payer: COMMERCIAL

## 2025-01-07 LAB
A ALTERNATA IGE QN: <0.1 KU/L
A FUMIGATUS IGE QN: <0.1 KU/L
ALLERGEN BOXELDER MAPLE TREE IGE: <0.1 KU/L
BAHIA GRASS IGE QN: <0.1 KU/L
BERMUDA GRASS IGE QN: <0.1 KU/L
C HERBARUM IGE QN: <0.1 KU/L
C LUNATA IGE QN: <0.1 KU/L
CAT DANDER IGE QN: <0.1 KU/L
CEDAR IGE QN: <0.1 KU/L
COTTONWOOD IGE QN: <0.1 KU/L
D FARINAE IGE QN: 0.49 KU/L
D PTERONYSS IGE QN: 0.58 KU/L
DEPRECATED CEDAR IGE RAST QL: NORMAL
DEPRECATED M RACEMOSUS IGE RAST QL: NORMAL
DEPRECATED TIMOTHY IGE RAST QL: NORMAL
DOG DANDER IGE QN: <0.1 KU/L
ELDER IGE QN: <0.1 KU/L
ENGL PLANTAIN IGE QN: <0.1 KU/L
EPICOCCUM PURPURASCENS, IGE: <0.1 KU/L
JOHNSON GRASS IGE QN: <0.1 KU/L
M RACEMOSUS IGE QN: <0.1 KU/L
P NOTATUM IGE QN: <0.1 KU/L
PECAN/HICK TREE IGE QN: <0.1 KU/L
RAST CLASS: ABNORMAL
RAST CLASS: ABNORMAL
RAST CLASS: NORMAL
S ROSTRATA IGE QN: <0.1 KU/L
SALTWORT IGE QN: <0.1 KU/L
SHEEP SORREL IGE QN: <0.1 KU/L
TIMOTHY IGE QN: <0.1 KU/L
WEST RAGWEED IGE QN: <0.1 KU/L
WHITE OAK IGE QN: <0.1 KU/L

## 2025-01-08 ENCOUNTER — PATIENT MESSAGE (OUTPATIENT)
Dept: ALLERGY | Facility: CLINIC | Age: 25
End: 2025-01-08
Payer: COMMERCIAL

## 2025-02-26 ENCOUNTER — HOSPITAL ENCOUNTER (OUTPATIENT)
Dept: RADIOLOGY | Facility: OTHER | Age: 25
Discharge: HOME | End: 2025-02-26
Attending: PHYSICIAN ASSISTANT
Payer: COMMERCIAL

## 2025-02-26 ENCOUNTER — OFFICE VISIT (OUTPATIENT)
Dept: OBSTETRICS AND GYNECOLOGY | Facility: CLINIC | Age: 25
End: 2025-02-26
Payer: COMMERCIAL

## 2025-02-26 VITALS
WEIGHT: 141.13 LBS | DIASTOLIC BLOOD PRESSURE: 64 MMHG | SYSTOLIC BLOOD PRESSURE: 100 MMHG | BODY MASS INDEX: 23.48 KG/M2

## 2025-02-26 DIAGNOSIS — Z32.01 POSITIVE URINE PREGNANCY TEST: ICD-10-CM

## 2025-02-26 DIAGNOSIS — R10.9 ABDOMINAL PAIN, UNSPECIFIED ABDOMINAL LOCATION: Primary | ICD-10-CM

## 2025-02-26 LAB
B-HCG UR QL: POSITIVE
BILIRUB SERPL-MCNC: ABNORMAL MG/DL
BLOOD URINE, POC: 3
CLARITY, POC UA: CLEAR
COLOR, POC UA: YELLOW
CTP QC/QA: YES
GLUCOSE UR QL STRIP: ABNORMAL
KETONES UR QL STRIP: ABNORMAL
LEUKOCYTE ESTERASE URINE, POC: ABNORMAL
NITRITE, POC UA: ABNORMAL
PH, POC UA: 7
PROTEIN, POC: ABNORMAL
SPECIFIC GRAVITY, POC UA: 1020
UROBILINOGEN, POC UA: ABNORMAL

## 2025-02-26 PROCEDURE — 1159F MED LIST DOCD IN RCRD: CPT | Mod: CPTII,S$GLB,, | Performed by: PHYSICIAN ASSISTANT

## 2025-02-26 PROCEDURE — 81025 URINE PREGNANCY TEST: CPT | Mod: S$GLB,,, | Performed by: PHYSICIAN ASSISTANT

## 2025-02-26 PROCEDURE — 99214 OFFICE O/P EST MOD 30 MIN: CPT | Mod: S$GLB,,, | Performed by: PHYSICIAN ASSISTANT

## 2025-02-26 PROCEDURE — 3074F SYST BP LT 130 MM HG: CPT | Mod: CPTII,S$GLB,, | Performed by: PHYSICIAN ASSISTANT

## 2025-02-26 PROCEDURE — 81515 NFCT DS BV&VAGINITIS DNA ALG: CPT | Performed by: PHYSICIAN ASSISTANT

## 2025-02-26 PROCEDURE — 3008F BODY MASS INDEX DOCD: CPT | Mod: CPTII,S$GLB,, | Performed by: PHYSICIAN ASSISTANT

## 2025-02-26 PROCEDURE — 81002 URINALYSIS NONAUTO W/O SCOPE: CPT | Mod: S$GLB,,, | Performed by: PHYSICIAN ASSISTANT

## 2025-02-26 PROCEDURE — 87086 URINE CULTURE/COLONY COUNT: CPT | Performed by: PHYSICIAN ASSISTANT

## 2025-02-26 PROCEDURE — 76817 TRANSVAGINAL US OBSTETRIC: CPT | Mod: TC

## 2025-02-26 PROCEDURE — 3078F DIAST BP <80 MM HG: CPT | Mod: CPTII,S$GLB,, | Performed by: PHYSICIAN ASSISTANT

## 2025-02-26 PROCEDURE — 99999 PR PBB SHADOW E&M-EST. PATIENT-LVL III: CPT | Mod: PBBFAC,,, | Performed by: PHYSICIAN ASSISTANT

## 2025-02-26 PROCEDURE — 87491 CHLMYD TRACH DNA AMP PROBE: CPT | Performed by: PHYSICIAN ASSISTANT

## 2025-02-26 RX ORDER — ACETAMINOPHEN 500 MG
1000 TABLET ORAL EVERY 6 HOURS PRN
Qty: 30 TABLET | Refills: 2 | Status: SHIPPED | OUTPATIENT
Start: 2025-02-26 | End: 2026-02-26

## 2025-02-26 RX ORDER — ACETAMINOPHEN 500 MG
1000 TABLET ORAL EVERY 6 HOURS PRN
Qty: 100 TABLET | Refills: 2 | Status: SHIPPED | OUTPATIENT
Start: 2025-02-26 | End: 2025-02-26

## 2025-02-26 NOTE — PROGRESS NOTES
CC: Threatened miscarriage     HPI: Pt is a 24 y.o.  female who presents for complaining of a threatened miscarriage.  She reports vaginal bleeding; started this weekend as heavy clotting, now getting lighter. Reports cramping/abdominal pain. States pain is in pelvic area, generalized. Worse with micturition. She also reports breast tenderness, constipation, fatigue, dizziness. Patient's last menstrual period was 2025 (approximate).     ROS:  GENERAL: Feeling well overall. Denies fever or chills.   SKIN: Denies rash or lesions.   HEAD: Denies head injury or headache.   NODES: Denies enlarged lymph nodes.   CHEST: Denies chest pain or shortness of breath.   CARDIOVASCULAR: Denies palpitations or left sided chest pain.   ABDOMEN: No abdominal pain, constipation, diarrhea, nausea, vomiting or rectal bleeding.   URINARY: No dysuria, hematuria, or burning on urination.  REPRODUCTIVE: See HPI.   BREASTS: Denies pain, lumps, or nipple discharge.     PE:   APPEARANCE: Well nourished, well developed, female in no acute distress.  VULVA: No lesions. Normal external female genitalia.  URETHRAL MEATUS: Normal size and location, no lesions, no prolapse.  URETHRA: No masses, tenderness, or prolapse.  VAGINA: Moist. No lesions or lacerations noted. No abnormal discharge present. No odor present.   CERVIX: No lesions or discharge. No cervical motion tenderness.   UTERUS: Normal size, regular shape, mobile, non-tender.  ADNEXA: No tenderness. No fullness or masses palpated in the adnexal regions.   ANUS PERINEUM: Normal.    OB History    Para Term  AB Living   1 0 0 0 0 0   SAB IAB Ectopic Multiple Live Births   0 0 0 0 0      # Outcome Date GA Lbr Real/2nd Weight Sex Type Anes PTL Lv   1 Current                Past Medical History:   Diagnosis Date    ADHD (attention deficit hyperactivity disorder)     Allergy     Anxiety     Asthma     Depression     Psychiatric problem     Therapy        Past Surgical History:    Procedure Laterality Date    ESOPHAGOGASTRODUODENOSCOPY N/A 2024    Procedure: EGD (ESOPHAGOGASTRODUODENOSCOPY);  Surgeon: Kenneth Chaudhari MD;  Location: [facility];  Service: Endoscopy;  Laterality: N/A;  referral dr chaudhari/ vic instructions given in clinic / Procedure Timin-12 weeks   precall complete-ml    EXAMINATION UNDER ANESTHESIA N/A 2020    Procedure: EXAM UNDER ANESTHESIA;  Surgeon: Delfina Surgeon;  Location: [facility];  Service: Anesthesiology;  Laterality: N/A;    ROTATOR CUFF REPAIR         Family History   Problem Relation Name Age of Onset    Asthma Mother      Eczema Mother      Allergies Mother      Hypertension Mother      Allergies Father      ADD / ADHD Sister      Hypertension Maternal Grandmother      Drug abuse Maternal Grandmother      Hypertension Maternal Grandfather      Drug abuse Maternal Grandfather      Hypertension Paternal Grandmother      Drug abuse Paternal Grandmother      Hypertension Paternal Grandfather      Drug abuse Paternal Grandfather      Breast cancer Neg Hx      Colon cancer Neg Hx      Ovarian cancer Neg Hx      Esophageal cancer Neg Hx         Social History     Socioeconomic History    Marital status: Single   Tobacco Use    Smoking status: Some Days     Passive exposure: Current    Smokeless tobacco: Never   Substance and Sexual Activity    Alcohol use: Yes     Comment: socially    Drug use: Yes     Types: Marijuana    Sexual activity: Yes     Partners: Male     Birth control/protection: Condom   Social History Narrative    Lives with her mother. 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     Social Drivers of Health     Financial Resource Strain: Low Risk  (2024)    Overall Financial Resource Strain (CARDIA)     Difficulty of Paying Living Expenses: Not very hard   Food Insecurity: No Food Insecurity (2024)    Hunger Vital Sign     Worried About Running Out of Food in the Last Year: Never true     Ran Out of Food in the Last Year: Never  true   Physical Activity: Sufficiently Active (7/30/2024)    Exercise Vital Sign     Days of Exercise per Week: 5 days     Minutes of Exercise per Session: 50 min   Stress: Stress Concern Present (7/30/2024)    [employer] [place] [employer] - Occupational Stress Questionnaire     Feeling of Stress : Very much   Housing Stability: Unknown (7/30/2024)    Housing Stability Vital Sign     Unable to Pay for Housing in the Last Year: No       Current Medications[1]    Review of patient's allergies indicates:  No Known Allergies      ASSESSMENT AND PLAN      ICD-10-CM ICD-9-CM    1. Abdominal pain, unspecified abdominal location  R10.9 789.00 POCT Urine Pregnancy      POCT urine dipstick without microscope      Urine culture      Vaginosis Screen by DNA Probe      C. trachomatis/N. gonorrhoeae by AMP DNA [facility]; Cervicovaginal      2. Positive urine pregnancy test  Z32.01 V72.42 HCG, Quantitative      US Pelvis Comp with Transvag NON-OB (xpd      Progesterone      Type & Screen - Ob Profile          Discussed likely SAB with concern for ectopic - Stat US ordered. ER precautions given.   Follow up hCG   Discussed to call and RTC if  Has a Fever above 100.4°F or chills, bright red vaginal bleeding that soaks more than 1 pad per hour, or a foul smelling discharge  Discussed Naproxen PRN discomfort.   Elvia Servin PA-C             [1]   Current Outpatient Medications   Medication Sig Dispense Refill    acetaminophen (TYLENOL EXTRA STRENGTH) 500 MG tablet Take 2 tablets (1,000 mg total) by mouth every 6 (six) hours as needed for Pain. 30 tablet 2    albuterol (PROVENTIL/VENTOLIN HFA) 90 mcg/actuation inhaler Inhale 2 puffs into the lungs every 6 (six) hours as needed for Wheezing or Shortness of Breath. 8 g 0    cetirizine (ZYRTEC) 10 MG tablet Take 10 mg by mouth once daily.      dextroamphetamine-amphetamine (ADDERALL) 5 mg Tab Take 1 tablet po q am and 1 tablet po at noon prn inattention 60 tablet 0     famotidine (PEPCID) 20 MG tablet Take 1 tablet (20 mg total) by mouth 2 (two) times daily. for 14 days 28 tablet 0    fluticasone propionate (FLONASE) 50 mcg/actuation nasal spray 1 spray (50 mcg total) by Each Nostril route 2 (two) times daily. 9.9 mL 0    hydrOXYzine pamoate (VISTARIL) 25 MG Cap Take 1 capsule (25 mg total) by mouth 4 (four) times daily as needed (anxiety). 20 capsule 0    ketorolac (TORADOL) 10 mg tablet Take 1 tablet (10 mg total) by mouth every 6 (six) hours. 15 tablet 0    ondansetron (ZOFRAN-ODT) 4 MG TbDL Take 1 tablet (4 mg total) by mouth every 6 (six) hours as needed (nausea). 20 tablet 0    promethazine (PHENERGAN) 12.5 MG Tab Take 1 tablet (12.5 mg total) by mouth 4 (four) times daily as needed (severe nausea/vomiting). 12 tablet 0     No current facility-administered medications for this visit.

## 2025-02-28 ENCOUNTER — OFFICE VISIT (OUTPATIENT)
Dept: OBSTETRICS AND GYNECOLOGY | Facility: CLINIC | Age: 25
End: 2025-02-28
Payer: COMMERCIAL

## 2025-02-28 VITALS
BODY MASS INDEX: 23.47 KG/M2 | SYSTOLIC BLOOD PRESSURE: 112 MMHG | HEIGHT: 65 IN | DIASTOLIC BLOOD PRESSURE: 64 MMHG | WEIGHT: 140.88 LBS

## 2025-02-28 DIAGNOSIS — O03.9 SPONTANEOUS ABORTION: Primary | ICD-10-CM

## 2025-02-28 LAB
BACTERIA UR CULT: NORMAL
BACTERIAL VAGINOSIS DNA: DETECTED
C TRACH DNA SPEC QL NAA+PROBE: NOT DETECTED
CANDIDA GLABRATA/KRUSEI: NOT DETECTED
CANDIDA RRNA VAG QL PROBE: NOT DETECTED
N GONORRHOEA DNA SPEC QL NAA+PROBE: NOT DETECTED
TRICHOMONAS VAGINALIS: NOT DETECTED

## 2025-02-28 PROCEDURE — 99999 PR PBB SHADOW E&M-EST. PATIENT-LVL III: CPT | Mod: PBBFAC,,, | Performed by: OBSTETRICS & GYNECOLOGY

## 2025-02-28 RX ORDER — MISOPROSTOL 200 UG/1
200 TABLET ORAL EVERY 4 HOURS
Qty: 6 TABLET | Refills: 0 | Status: SHIPPED | OUTPATIENT
Start: 2025-02-28 | End: 2025-03-01

## 2025-02-28 RX ORDER — LISDEXAMFETAMINE DIMESYLATE 30 MG/1
CAPSULE ORAL
COMMUNITY
Start: 2025-02-04

## 2025-02-28 NOTE — PROGRESS NOTES
Subjective     Patient ID: Yahaira Darling is a 24 y.o. female.    Chief Complaint:  Follow-up (Follow up miscarriage- Retained POC. Pt had an ultrasound on 2025)      History of Present Illness  HPI  Patient comes in today for follow-up  Status post spontaneous  2025  Came in on 2025 with bleeding  Ultrasound that day with poss retained products of conception    Bleeding much improved today  Denies fever and chills.  No nausea or vomiting. No pain    GYN & OB History  Patient's last menstrual period was 2025 (approximate).   Date of Last Pap: 2023    OB History    Para Term  AB Living   1 0 0 0 0 0   SAB IAB Ectopic Multiple Live Births   0 0 0 0 0      # Outcome Date GA Lbr Real/2nd Weight Sex Type Anes PTL Lv   1               Past Medical History:   Diagnosis Date    ADHD (attention deficit hyperactivity disorder)     Allergy     Anxiety     Asthma     Depression     Psychiatric problem     Therapy        Past Surgical History:   Procedure Laterality Date    ESOPHAGOGASTRODUODENOSCOPY N/A 2024    Procedure: EGD (ESOPHAGOGASTRODUODENOSCOPY);  Surgeon: Kenneth Chaudhari MD;  Location: [facility];  Service: Endoscopy;  Laterality: N/A;  referral dr chaudhari/ vic instructions given in clinic / Procedure Timin-12 weeks   precall complete-ml    EXAMINATION UNDER ANESTHESIA N/A 2020    Procedure: EXAM UNDER ANESTHESIA;  Surgeon: Delfina Surgeon;  Location: [facility];  Service: Anesthesiology;  Laterality: N/A;    ROTATOR CUFF REPAIR         Family History   Problem Relation Name Age of Onset    Asthma Mother      Eczema Mother      Allergies Mother      Hypertension Mother      Allergies Father      ADD / ADHD Sister      Hypertension Maternal Grandmother      Drug abuse Maternal Grandmother      Hypertension Maternal Grandfather      Drug abuse Maternal Grandfather      Hypertension Paternal Grandmother      Drug abuse Paternal Grandmother      Hypertension  Paternal Grandfather      Drug abuse Paternal Grandfather      Breast cancer Neg Hx      Colon cancer Neg Hx      Ovarian cancer Neg Hx      Esophageal cancer Neg Hx         Social History     Socioeconomic History    Marital status: Single   Tobacco Use    Smoking status: Some Days     Passive exposure: Current    Smokeless tobacco: Never   Substance and Sexual Activity    Alcohol use: Yes     Comment: socially    Drug use: Yes     Types: Marijuana    Sexual activity: Yes     Partners: Male     Birth control/protection: Condom   Social History Narrative    Lives with her mother. 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     Social Drivers of Health     Financial Resource Strain: Low Risk  (7/30/2024)    Overall Financial Resource Strain (CARDIA)     Difficulty of Paying Living Expenses: Not very hard   Food Insecurity: No Food Insecurity (7/30/2024)    Hunger Vital Sign     Worried About Running Out of Food in the Last Year: Never true     Ran Out of Food in the Last Year: Never true   Physical Activity: Sufficiently Active (7/30/2024)    Exercise Vital Sign     Days of Exercise per Week: 5 days     Minutes of Exercise per Session: 50 min   Stress: Stress Concern Present (7/30/2024)    [place] [employer] - Occupational Stress Questionnaire     Feeling of Stress : Very much   Housing Stability: Unknown (7/30/2024)    Housing Stability Vital Sign     Unable to Pay for Housing in the Last Year: No       Current Medications[1]    Review of patient's allergies indicates:  No Known Allergies    Review of Systems  Review of Systems   Constitutional:  Negative for activity change, appetite change, chills, fatigue, fever and unexpected weight change.   HENT:  Negative for mouth sores.    Respiratory:  Negative for cough, shortness of breath and wheezing.    Cardiovascular:  Negative for chest pain and palpitations.   Gastrointestinal:  Negative for abdominal pain, bloating, blood in stool,  constipation, nausea and vomiting.   Endocrine: Negative for diabetes and hot flashes.   Genitourinary:  Positive for vaginal bleeding. Negative for dysmenorrhea, dyspareunia, dysuria, frequency, hematuria, menorrhagia, menstrual problem, pelvic pain, urgency, vaginal discharge, vaginal pain, urinary incontinence, postcoital bleeding and vaginal odor.   Musculoskeletal:  Negative for back pain and myalgias.   Integumentary:  Negative for rash, breast mass and nipple discharge.   Neurological:  Negative for seizures and headaches.   Psychiatric/Behavioral:  Negative for depression and sleep disturbance. The patient is not nervous/anxious.    Breast: Negative for mass, mastodynia and nipple discharge         Objective   Physical Exam:   Constitutional: She appears well-developed and well-nourished. No distress.   BMI of 23.44    HENT:   Head: Normocephalic and atraumatic.    Eyes: EOM are normal.      Pulmonary/Chest: Effort normal. No respiratory distress.   Breasts: Non-tender, no engorgement, no masses, no retraction, no discharge. Negative for lymphadenopathy.         Abdominal: Soft. She exhibits no distension. There is no abdominal tenderness. There is no rebound and no guarding.     Genitourinary:    Uterus normal.   There is vaginal discharge in the vagina.    Genitourinary Comments: Vulva without any obvious lesions.  Urethral meatus normal size and location without any lesion.  Urethra is non-tender without stricture or discharge.  Bladder is non-tender.  Vaginal vault with good support.  Minimal bloody discharge noted.  No obvious lesion.  Normal rugation.  Cervix is without any cervical motion tenderness.  No obvious lesion. Os is closed but soft.  Uterus is small, non-tender, normal contour.  Adnexa is without any masses or tenderness.  Perineum without obvious lesion.               Musculoskeletal: Normal range of motion.       Neurological: She is alert.    Skin: Skin is warm and dry.    Psychiatric:  She has a normal mood and affect.        Transvag ultrasound performed by me.  Minimal POCs in endometrial cavity        Assessment and Plan     1. Spontaneous            Plan:  I have discussed with the patient regarding her condition.  Cytotec 200 mcg q6hrs x 6  Naproxen for pain  Back in 2 weeks    ** A female chaperone, Niru Arlene, was present for the pelvic exam                     [1]   Current Outpatient Medications   Medication Sig Dispense Refill    acetaminophen (TYLENOL EXTRA STRENGTH) 500 MG tablet Take 2 tablets (1,000 mg total) by mouth every 6 (six) hours as needed for Pain. 30 tablet 2    cetirizine (ZYRTEC) 10 MG tablet Take 10 mg by mouth once daily.      dextroamphetamine-amphetamine (ADDERALL) 5 mg Tab Take 1 tablet po q am and 1 tablet po at noon prn inattention 60 tablet 0    fluticasone propionate (FLONASE) 50 mcg/actuation nasal spray 1 spray (50 mcg total) by Each Nostril route 2 (two) times daily. 9.9 mL 0    hydrOXYzine pamoate (VISTARIL) 25 MG Cap Take 1 capsule (25 mg total) by mouth 4 (four) times daily as needed (anxiety). 20 capsule 0    ketorolac (TORADOL) 10 mg tablet Take 1 tablet (10 mg total) by mouth every 6 (six) hours. 15 tablet 0    ondansetron (ZOFRAN-ODT) 4 MG TbDL Take 1 tablet (4 mg total) by mouth every 6 (six) hours as needed (nausea). 20 tablet 0    promethazine (PHENERGAN) 12.5 MG Tab Take 1 tablet (12.5 mg total) by mouth 4 (four) times daily as needed (severe nausea/vomiting). 12 tablet 0    VYVANSE 30 mg capsule        No current facility-administered medications for this visit.

## 2025-03-05 ENCOUNTER — RESULTS FOLLOW-UP (OUTPATIENT)
Dept: OBSTETRICS AND GYNECOLOGY | Facility: CLINIC | Age: 25
End: 2025-03-05
Payer: COMMERCIAL

## 2025-03-05 RX ORDER — METRONIDAZOLE 500 MG/1
500 TABLET ORAL EVERY 12 HOURS
Qty: 14 TABLET | Refills: 0 | Status: SHIPPED | OUTPATIENT
Start: 2025-03-05 | End: 2025-03-12

## 2025-03-09 ENCOUNTER — OFFICE VISIT (OUTPATIENT)
Dept: URGENT CARE | Facility: CLINIC | Age: 25
End: 2025-03-09
Payer: COMMERCIAL

## 2025-03-09 VITALS
SYSTOLIC BLOOD PRESSURE: 105 MMHG | TEMPERATURE: 99 F | WEIGHT: 140 LBS | HEIGHT: 65 IN | OXYGEN SATURATION: 98 % | RESPIRATION RATE: 18 BRPM | DIASTOLIC BLOOD PRESSURE: 65 MMHG | BODY MASS INDEX: 23.32 KG/M2 | HEART RATE: 85 BPM

## 2025-03-09 DIAGNOSIS — R82.90 ABNORMAL URINALYSIS: ICD-10-CM

## 2025-03-09 DIAGNOSIS — B34.9 ACUTE VIRAL SYNDROME: Primary | ICD-10-CM

## 2025-03-09 DIAGNOSIS — R50.9 FEVER, UNSPECIFIED FEVER CAUSE: ICD-10-CM

## 2025-03-09 LAB
B-HCG UR QL: POSITIVE
BILIRUBIN, UA POC OHS: ABNORMAL
BLOOD, UA POC OHS: NEGATIVE
CLARITY, UA POC OHS: CLEAR
COLOR, UA POC OHS: YELLOW
CTP QC/QA: YES
GLUCOSE, UA POC OHS: NEGATIVE
KETONES, UA POC OHS: NEGATIVE
LEUKOCYTES, UA POC OHS: ABNORMAL
NITRITE, UA POC OHS: NEGATIVE
PH, UA POC OHS: 6.5
POC MOLECULAR INFLUENZA A AGN: NEGATIVE
POC MOLECULAR INFLUENZA B AGN: NEGATIVE
PROTEIN, UA POC OHS: >=300
SARS CORONAVIRUS 2 ANTIGEN: NEGATIVE
SPECIFIC GRAVITY, UA POC OHS: >=1.03
UROBILINOGEN, UA POC OHS: 0.2

## 2025-03-09 PROCEDURE — 81025 URINE PREGNANCY TEST: CPT | Mod: S$GLB,,, | Performed by: NURSE PRACTITIONER

## 2025-03-09 PROCEDURE — 87811 SARS-COV-2 COVID19 W/OPTIC: CPT | Mod: QW,S$GLB,, | Performed by: NURSE PRACTITIONER

## 2025-03-09 PROCEDURE — 99213 OFFICE O/P EST LOW 20 MIN: CPT | Mod: S$GLB,,, | Performed by: NURSE PRACTITIONER

## 2025-03-09 PROCEDURE — 81003 URINALYSIS AUTO W/O SCOPE: CPT | Mod: QW,S$GLB,, | Performed by: NURSE PRACTITIONER

## 2025-03-09 PROCEDURE — 87502 INFLUENZA DNA AMP PROBE: CPT | Mod: QW,S$GLB,, | Performed by: NURSE PRACTITIONER

## 2025-03-09 RX ORDER — CEPHALEXIN 500 MG/1
500 CAPSULE ORAL EVERY 12 HOURS
Qty: 10 CAPSULE | Refills: 0 | Status: SHIPPED | OUTPATIENT
Start: 2025-03-09 | End: 2025-03-14

## 2025-03-09 RX ORDER — OSELTAMIVIR PHOSPHATE 75 MG/1
75 CAPSULE ORAL 2 TIMES DAILY
Qty: 10 CAPSULE | Refills: 0 | Status: SHIPPED | OUTPATIENT
Start: 2025-03-09 | End: 2025-03-09

## 2025-03-09 RX ORDER — OSELTAMIVIR PHOSPHATE 75 MG/1
75 CAPSULE ORAL 2 TIMES DAILY
Qty: 10 CAPSULE | Refills: 0 | Status: SHIPPED | OUTPATIENT
Start: 2025-03-09 | End: 2025-03-13

## 2025-03-09 NOTE — PROGRESS NOTES
"Subjective:      Patient ID: Yahaira Darling is a 24 y.o. female.    Vitals:  height is 5' 4.96" (1.65 m) and weight is 63.5 kg (140 lb). Her oral temperature is 98.5 °F (36.9 °C). Her blood pressure is 105/65 and her pulse is 85. Her respiration is 18 and oxygen saturation is 98%.     Chief Complaint: Fever (2 day history of fever, chills, headache and generalized fatigue)    24 y.o. female presenting with a 2 day history of fever, chills,  headache, fatigue, body aches, patient with a threatened miscarriage on Feb 28, 2025 and testing posiitve for Baterial Vaginosis.  Prescribe Flagyl by OBGYN had the follow up visit with the OBGYN recently started taking the Flagyl, patient was also prescribed misopristol prescribe that she have not started yet, patient reports vaginal spotting better, denies any vaginal pain or discharge,  She states that she did not take the misoprostol as she wanted to continue Emanuel tagga festivities.  Denies any urinary frequency, urgency, dysuria hematuria, denies back pain or flank pain, Patient denies any chest pain, exertional dyspnea, cough, congestion, headaches, vomiting, dysuria or recent illnesses. Patient endorses moderate relief of fever and chills with OTC acetaminophen. She last took acetaminophen last night. No sick contacts identified.     Fever   This is a new problem. The current episode started 2 days ago. The problem occurs cycles. The problem has been gradually worsening. She has not experienced a heat injury.Her temperature was unmeasured prior to arrival. Pertinent negatives include no abdominal pain, congestion, diarrhea, nausea or vomiting.       Constitution: Positive for fatigue and fever.   HENT:  Negative for congestion.    Gastrointestinal:  Negative for abdominal pain, abdominal bloating, nausea, vomiting, constipation and diarrhea.   Musculoskeletal:  Positive for muscle ache.      Objective:     Physical Exam   Constitutional: She is oriented to person, " place, and time. She appears well-developed. She is cooperative.  Non-toxic appearance. She does not appear ill. No distress.   HENT:   Head: Normocephalic and atraumatic.   Ears:   Right Ear: Hearing, tympanic membrane, external ear and ear canal normal.   Left Ear: Hearing, tympanic membrane, external ear and ear canal normal.   Nose: Nose normal. No mucosal edema, rhinorrhea or nasal deformity. No epistaxis. Right sinus exhibits no maxillary sinus tenderness and no frontal sinus tenderness. Left sinus exhibits no maxillary sinus tenderness and no frontal sinus tenderness.   Mouth/Throat: Uvula is midline, oropharynx is clear and moist and mucous membranes are normal. No trismus in the jaw. Normal dentition. No uvula swelling. No oropharyngeal exudate, posterior oropharyngeal edema, posterior oropharyngeal erythema, tonsillar abscesses or cobblestoning.   Eyes: Conjunctivae and lids are normal. No scleral icterus.   Neck: Trachea normal and phonation normal. Neck supple. No edema present. No erythema present. No neck rigidity present.   Cardiovascular: Normal rate, regular rhythm, normal heart sounds and normal pulses.   Pulmonary/Chest: Effort normal and breath sounds normal. No stridor. No respiratory distress. She has no decreased breath sounds. She has no wheezes. She has no rhonchi. She has no rales.   Abdominal: Normal appearance.   Musculoskeletal: Normal range of motion.         General: No deformity. Normal range of motion.   Neurological: She is alert and oriented to person, place, and time. She exhibits normal muscle tone. Coordination normal.   Skin: Skin is warm, dry, intact, not diaphoretic and not pale.   Psychiatric: Her speech is normal and behavior is normal. Judgment and thought content normal.   Nursing note and vitals reviewed.    Results for orders placed or performed in visit on 03/09/25   POCT Influenza A/B MOLECULAR    Collection Time: 03/09/25  2:29 PM   Result Value Ref Range    POC  Molecular Influenza A Ag Negative Negative    POC Molecular Influenza B Ag Negative Negative     Acceptable Yes    SARS Coronavirus 2 Antigen, POCT Manual Read    Collection Time: 03/09/25  2:37 PM   Result Value Ref Range    SARS Coronavirus 2 Antigen Negative Negative, Presumptive Negative     Acceptable Yes    POCT Urinalysis(Instrument)    Collection Time: 03/09/25  2:57 PM   Result Value Ref Range    Color, POC UA Yellow Yellow, Straw, Colorless    Clarity, POC UA Clear Clear    Glucose, POC UA Negative Negative    Bilirubin, POC UA Small (A) Negative    Ketones, POC UA Negative Negative    Spec Grav POC UA >=1.030 1.005 - 1.030    Blood, POC UA Negative Negative    pH, POC UA 6.5 5.0 - 8.0    Protein, POC UA >=300 (A) Negative    Urobilinogen, POC UA 0.2 <=1.0    Nitrite, POC UA Negative Negative    WBC, POC UA Trace (A) Negative   POCT urine pregnancy    Collection Time: 03/09/25  2:59 PM   Result Value Ref Range    POC Preg Test, Ur Positive (A) Negative     Acceptable Yes          Patient in no acute distress.  Vitals reassuring.  Discussed results/diagnosis/plan in depth with patient in clinic. Strict precautions given to patient to monitor for worsening signs and symptoms. Advised to follow up with primary.All questions answered. Strict ER precautions given. If your symptoms worsens or fail to improve you should go to the Emergency Room. Discharge and follow-up instructions given verbally/printed. Discharge and follow-up instructions discussed with the patient who expressed understanding and willingness to comply with my recommendations.Patient voiced understanding and in agreement with current treatment plan.     Please be advised this text was dictated with [employer] software and may contain errors due to translation.     Assessment:     1. Acute viral syndrome    2. Fever, unspecified fever cause    3. Abnormal urinalysis        Plan:       Acute viral  syndrome    Fever, unspecified fever cause  -     POCT Influenza A/B MOLECULAR  -     SARS Coronavirus 2 Antigen, POCT Manual Read  -     POCT urine pregnancy  -     Cancel: POCT Urinalysis, Dipstick, Manual, W/O Scope  -     POCT Urinalysis(Instrument)    Abnormal urinalysis    Other orders  -     Discontinue: oseltamivir (TAMIFLU) 75 MG capsule; Take 1 capsule (75 mg total) by mouth 2 (two) times daily. for 5 days  Dispense: 10 capsule; Refill: 0  -     oseltamivir (TAMIFLU) 75 MG capsule; Take 1 capsule (75 mg total) by mouth 2 (two) times daily. for 5 days  Dispense: 10 capsule; Refill: 0  -     cephALEXin (KEFLEX) 500 MG capsule; Take 1 capsule (500 mg total) by mouth every 12 (twelve) hours. for 5 days  Dispense: 10 capsule; Refill: 0          Medical Decision Making:   History:   Old Medical Records: I decided to obtain old medical records.  Old Records Summarized: records from clinic visits.  Clinical Tests:   Lab Tests: Reviewed  Urgent Care Management:  Patient in no acute distress.  Vitals reassuring.  On exam, patient is nontoxic appearing and afebrile.  Lungs CTA.  Patient with fever body aches and flu-like symptoms.  Negative COVID-19 and flu test results discussed with patient in detail.  Patient's symptoms started yesterday.  Tamiflu recommendation and to initiate within 48 hours discussed with patient in detail.  Patient denies any urinary symptoms denies any vaginal pain or discharge patient recently taking the Flagyl prescribed by OBGYN secondary to her having the threatened .  Patient was prescribed misoprostol that she have not started yet.  Urinalysis and UPT as above.  Urinalysis with trace leukocytes.  Will cover her with Keflex.  Advised patient further evaluation and follow up with OBGYN if no improvement in symptoms or worsening symptoms.  Strict ER precautions discussed.  Medication prescribed and over-the-counter medication discussed with patient at length.  Proper hydration  advised.  I reiterated the importance of further evaluation if no improvement symptoms and follow-up with primary. Patient voiced understanding and in agreement with current treatment plan.           Patient Instructions   PLEASE READ YOUR DISCHARGE INSTRUCTIONS ENTIRELY AS IT CONTAINS IMPORTANT INFORMATION.      You have been diagnosed with Influenza.     You are contagious for 24 hours after you start the Tamilfu or 24 hours after your last fever, whichever happens last.    Please drink plenty of fluids.    Please get plenty of rest.    Please return here or go to the Emergency Department for any concerns or worsening of condition.    Tamiflu prescription has been discussed and if prescribed, please take to completion unless you cannot tolerate the side effects.       Take tylenol (acetominophen) for fever, chills or body aches every 4 hours. do not exceed 4000 mg/ day.    Take Motrin (Ibuprofen) every 4 hours for fever, chills, pain or inflammation.    Use an antihistmine such as claritin or zyrtec to dry you out. Use pseudoephedrine (behind the counter) to decongest (beware this can raise your blood pressure). Use mucinex (guaifenisin) to break up mucous    Use over the counter flonase: one spray each nostril twice daily OR two sprays each nostril once daily.   If you find this dries your nose out or your nose bleeds, try using over the counter nasal saline a few minutes prior to using the flonase to moisten the lining of your nose.     Please return or see your primary care doctor if you develop new or worsening symptoms.     Please arrange follow up with your primary medical clinic as soon as possible. You must understand that you've received an Urgent Care treatment only and that you may be released before all of your medical problems are known or treated. You, the patient, will arrange for follow up as instructed. If your symptoms worsen or fail to improve you should go to the Emergency Room.  WE CANNOT RULE  OUT ALL POSSIBLE CAUSES OF YOUR SYMPTOMS IN THE URGENT CARE SETTING PLEASE GO TO THE ER IF YOU FEELS YOUR CONDITION IS WORSENING OR YOU WOULD LIKE EMERGENT EVALUATION.

## 2025-03-13 ENCOUNTER — LAB VISIT (OUTPATIENT)
Dept: LAB | Facility: HOSPITAL | Age: 25
End: 2025-03-13
Attending: OBSTETRICS & GYNECOLOGY
Payer: COMMERCIAL

## 2025-03-13 ENCOUNTER — OFFICE VISIT (OUTPATIENT)
Dept: OBSTETRICS AND GYNECOLOGY | Facility: CLINIC | Age: 25
End: 2025-03-13
Payer: COMMERCIAL

## 2025-03-13 ENCOUNTER — RESULTS FOLLOW-UP (OUTPATIENT)
Dept: OBSTETRICS AND GYNECOLOGY | Facility: CLINIC | Age: 25
End: 2025-03-13

## 2025-03-13 VITALS
DIASTOLIC BLOOD PRESSURE: 62 MMHG | SYSTOLIC BLOOD PRESSURE: 114 MMHG | HEIGHT: 65 IN | WEIGHT: 138.88 LBS | BODY MASS INDEX: 23.14 KG/M2

## 2025-03-13 DIAGNOSIS — O03.9 SPONTANEOUS ABORTION: ICD-10-CM

## 2025-03-13 DIAGNOSIS — O03.9 SPONTANEOUS ABORTION: Primary | ICD-10-CM

## 2025-03-13 LAB — HCG INTACT+B SERPL-ACNC: 471 MIU/ML

## 2025-03-13 PROCEDURE — 3078F DIAST BP <80 MM HG: CPT | Mod: CPTII,S$GLB,, | Performed by: OBSTETRICS & GYNECOLOGY

## 2025-03-13 PROCEDURE — 3008F BODY MASS INDEX DOCD: CPT | Mod: CPTII,S$GLB,, | Performed by: OBSTETRICS & GYNECOLOGY

## 2025-03-13 PROCEDURE — 1159F MED LIST DOCD IN RCRD: CPT | Mod: CPTII,S$GLB,, | Performed by: OBSTETRICS & GYNECOLOGY

## 2025-03-13 PROCEDURE — 1160F RVW MEDS BY RX/DR IN RCRD: CPT | Mod: CPTII,S$GLB,, | Performed by: OBSTETRICS & GYNECOLOGY

## 2025-03-13 PROCEDURE — 99999 PR PBB SHADOW E&M-EST. PATIENT-LVL III: CPT | Mod: PBBFAC,,, | Performed by: OBSTETRICS & GYNECOLOGY

## 2025-03-13 PROCEDURE — 84702 CHORIONIC GONADOTROPIN TEST: CPT | Performed by: OBSTETRICS & GYNECOLOGY

## 2025-03-13 PROCEDURE — 99213 OFFICE O/P EST LOW 20 MIN: CPT | Mod: S$GLB,,, | Performed by: OBSTETRICS & GYNECOLOGY

## 2025-03-13 PROCEDURE — 3074F SYST BP LT 130 MM HG: CPT | Mod: CPTII,S$GLB,, | Performed by: OBSTETRICS & GYNECOLOGY

## 2025-03-13 PROCEDURE — 36415 COLL VENOUS BLD VENIPUNCTURE: CPT | Performed by: OBSTETRICS & GYNECOLOGY

## 2025-03-13 NOTE — LETTER
March 13, 2025      [facility] [place] [facility]YN  [address]   [place] [address]  Phone: [phone]       Patient: Yahaira Darling   YOB: 2000  Date of Visit: 03/13/2025    To Whom It May Concern:    Colleen Darling  was at [facility] on 03/13/2025. The patient may return to work/school on Friday, 3/14/2025 with no restrictions. If you have any questions or concerns, or if I can be of further assistance, please do not hesitate to contact me.    Sincerely,            Marlo Jones MD

## 2025-03-13 NOTE — PROGRESS NOTES
Subjective     Patient ID: Yahaira Darling is a 24 y.o. female.    Chief Complaint:  Follow-up (2 wks follow up miscarriage)      History of Present Illness  HPI  Patient comes in today for follow-up  Status post spontaneous  2025  Came in on 2025 with bleeding  Ultrasound that day with poss retained products of conception   Cytotec given on 25 resulting in more bleeding  Bleeding much improved today.  Only minimal spotting for the past several days  Denies fever and chills.  No nausea or vomiting. No pain    Had some fever on 3/9/25.  Tested negative for flu and Covid that day  Feeling much better now      GYN & OB History  Patient's last menstrual period was 2025 (approximate).   Date of Last Pap: 2023    OB History    Para Term  AB Living   1 0 0 0 1 0   SAB IAB Ectopic Multiple Live Births   1 0 0 0 0      # Outcome Date GA Lbr Real/2nd Weight Sex Type Anes PTL Lv   1 SAB 2025     SAB        Past Medical History:   Diagnosis Date    ADHD (attention deficit hyperactivity disorder)     Allergy     Anxiety     Asthma     Depression     Psychiatric problem     Therapy        Past Surgical History:   Procedure Laterality Date    ESOPHAGOGASTRODUODENOSCOPY N/A 2024    Procedure: EGD (ESOPHAGOGASTRODUODENOSCOPY);  Surgeon: Kenneth Chaudhari MD;  Location: [facility];  Service: Endoscopy;  Laterality: N/A;  referral dr chaudhari/ vic instructions given in clinic / Procedure Timin-12 weeks   precall complete-ml    EXAMINATION UNDER ANESTHESIA N/A 2020    Procedure: EXAM UNDER ANESTHESIA;  Surgeon: Delfina Surgeon;  Location: [facility];  Service: Anesthesiology;  Laterality: N/A;    ROTATOR CUFF REPAIR         Family History   Problem Relation Name Age of Onset    Asthma Mother      Eczema Mother      Allergies Mother      Hypertension Mother      Allergies Father      ADD / ADHD Sister      Hypertension Maternal Grandmother      Drug abuse Maternal Grandmother       Hypertension Maternal Grandfather      Drug abuse Maternal Grandfather      Hypertension Paternal Grandmother      Drug abuse Paternal Grandmother      Hypertension Paternal Grandfather      Drug abuse Paternal Grandfather      Breast cancer Neg Hx      Colon cancer Neg Hx      Ovarian cancer Neg Hx      Esophageal cancer Neg Hx         Social History     Socioeconomic History    Marital status: Single   Tobacco Use    Smoking status: Some Days     Passive exposure: Current    Smokeless tobacco: Never   Substance and Sexual Activity    Alcohol use: Yes     Comment: socially    Drug use: Yes     Types: Marijuana    Sexual activity: Yes     Partners: Male     Birth control/protection: Condom   Social History Narrative    Lives with her mother. 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     Social Drivers of Health     Financial Resource Strain: Low Risk  (7/30/2024)    Overall Financial Resource Strain (CARDIA)     Difficulty of Paying Living Expenses: Not very hard   Food Insecurity: No Food Insecurity (7/30/2024)    Hunger Vital Sign     Worried About Running Out of Food in the Last Year: Never true     Ran Out of Food in the Last Year: Never true   Physical Activity: Sufficiently Active (7/30/2024)    Exercise Vital Sign     Days of Exercise per Week: 5 days     Minutes of Exercise per Session: 50 min   Stress: Stress Concern Present (7/30/2024)    [employer] [place] [employer] - Occupational Stress Questionnaire     Feeling of Stress : Very much   Housing Stability: Unknown (7/30/2024)    Housing Stability Vital Sign     Unable to Pay for Housing in the Last Year: No       Current Medications[1]    Review of patient's allergies indicates:  No Known Allergies    Review of Systems  Review of Systems   Constitutional:  Negative for activity change, appetite change, chills, fatigue, fever and unexpected weight change.   HENT:  Negative for mouth sores.    Respiratory:  Negative for cough, shortness  of breath and wheezing.    Cardiovascular:  Negative for chest pain and palpitations.   Gastrointestinal:  Negative for abdominal pain, bloating, blood in stool, constipation, nausea and vomiting.   Endocrine: Negative for diabetes and hot flashes.   Genitourinary:  Positive for vaginal bleeding. Negative for dysmenorrhea, dyspareunia, dysuria, frequency, hematuria, menorrhagia, menstrual problem, pelvic pain, urgency, vaginal discharge, vaginal pain, urinary incontinence, postcoital bleeding and vaginal odor.   Musculoskeletal:  Negative for back pain and myalgias.   Integumentary:  Negative for rash, breast mass and nipple discharge.   Neurological:  Negative for seizures and headaches.   Psychiatric/Behavioral:  Negative for depression and sleep disturbance. The patient is not nervous/anxious.    Breast: Negative for mass, mastodynia and nipple discharge         Objective   Physical Exam:   Constitutional: She appears well-developed and well-nourished. No distress.    HENT:   Head: Normocephalic and atraumatic.    Eyes: EOM are normal.     Cardiovascular:  Normal rate.             Pulmonary/Chest: Effort normal. No respiratory distress.        Abdominal: Soft. She exhibits no distension. There is no abdominal tenderness. There is no rebound and no guarding.             Musculoskeletal: Normal range of motion.       Neurological: She is alert.    Skin: Skin is warm and dry.    Psychiatric: She has a normal mood and affect.         UPT still positive today     Assessment and Plan     1. Spontaneous            Plan:  I have discussed with the patient regarding her condition.  HCG today  If low/negative, would discuss and prescribe contraceptive  Patient is considering the patch    Repeat UPT in 2 weeks                 [1]   Current Outpatient Medications   Medication Sig Dispense Refill    acetaminophen (TYLENOL EXTRA STRENGTH) 500 MG tablet Take 2 tablets (1,000 mg total) by mouth every 6 (six) hours as  needed for Pain. 30 tablet 2    cephALEXin (KEFLEX) 500 MG capsule Take 1 capsule (500 mg total) by mouth every 12 (twelve) hours. for 5 days 10 capsule 0    cetirizine (ZYRTEC) 10 MG tablet Take 10 mg by mouth once daily.      dextroamphetamine-amphetamine (ADDERALL) 5 mg Tab Take 1 tablet po q am and 1 tablet po at noon prn inattention (Patient not taking: Reported on 3/13/2025) 60 tablet 0    VYVANSE 30 mg capsule  (Patient not taking: Reported on 3/13/2025)       No current facility-administered medications for this visit.

## 2025-03-26 ENCOUNTER — ATHLETIC TRAINING SESSION (OUTPATIENT)
Dept: SPORTS MEDICINE | Facility: CLINIC | Age: 25
End: 2025-03-26
Payer: COMMERCIAL

## 2025-03-26 DIAGNOSIS — M62.89 MUSCLE TIGHTNESS: Primary | ICD-10-CM

## 2025-03-26 NOTE — PROGRESS NOTES
Reason for Encounter N/A    Subjective:     Chief Complaint: Yahaira Darling is a 24 y.o. 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    Handedness: right-handed  Sport played: track & field      Level: college      Position:sprints        Assessment:     Status: F - Full Participation    Date Seen:  03/24/2026 - 03/28/2025    Date of Injury:  n/a    Date Out:  n/a    Date Cleared:  n/a    Treatment/Rehab/Maintenance:     MAINTENANCE LOG  DATE OF SERVICE: 03/26/2025  INJURY/CONDITON: Right Hamstring      MODALITIES:    MISCELLANEOUS:       []Active Release   []Compression Wrap   []Cupping    []Support Wrap  []E-Stim- Compex   []Taping  []E-Stim- IFC    []Foam Roller  [x]E-Stim- Premod   []Cold Tub  []Joint Mobilization   []Contrast Tub  []Manual Therapy   [x]Hot Pack  []Massage    []Hot Tub  []Massage - Scar Tissue  []Ice Cup  []Myofascial Release   []Ice Pack  []Flossing/BFR   []GameReady  []Ultrasound  []Ultrasound - Phonophoresis  []Instrumental Assisted Soft Tissue Mobilization (IASTM)  []Intermittent Compression - Normatec  []Massage Gun  []ROM - Active  []ROM - Passive  []Stretching - Active  []Stretching - Dynamic  []Stretching - Passive  []Stretching - PNF  []Stretching - Static  []Mobility Work - Hip/Back  []Mobility Work - Ankle  []Mobility Work - Shoulder      DATE OF SERVICE: 03/24/2025  INJURY/CONDITON: Right Hamstring      MODALITIES:    MISCELLANEOUS:       []Active Release   []Compression Wrap   []Cupping    []Support Wrap  []E-Stim- Compex   []Taping  []E-Stim- IFC    []Foam Roller  []E-Stim- Premod   []Cold Tub  []Joint Mobilization   []Contrast Tub  []Manual Therapy   []Hot Pack  []Massage    []Hot Tub  []Massage - Scar Tissue  []Ice Cup  [x]Myofascial Release   []Ice Pack  []Flossing/BFR   []GameReady  []Ultrasound  []Ultrasound - Phonophoresis  []Instrumental Assisted Soft Tissue Mobilization  (IAS)  []Intermittent Compression - Normatec  []Massage Gun  []ROM - Active  []ROM - Passive  []Stretching - Active  []Stretching - Dynamic  [x]Stretching - Passive  []Stretching - PNF  []Stretching - Static  []Mobility Work - Hip/Back  []Mobility Work - Ankle  []Mobility Work - Shoulder      Plan:     1. Come into ATF as needed for maintenance/recovery  2. Physician Referral: no  3. ED Referral:no  4. Parent/Guardian Notified: No  5. All questions were answered, ath. will contact me for questions or concerns in the interim.  6.         Eligible to use School Insurance: Yes

## 2025-03-31 ENCOUNTER — LAB VISIT (OUTPATIENT)
Dept: LAB | Facility: HOSPITAL | Age: 25
End: 2025-03-31
Attending: OBSTETRICS & GYNECOLOGY
Payer: COMMERCIAL

## 2025-03-31 ENCOUNTER — RESULTS FOLLOW-UP (OUTPATIENT)
Dept: OBSTETRICS AND GYNECOLOGY | Facility: CLINIC | Age: 25
End: 2025-03-31

## 2025-03-31 DIAGNOSIS — O03.9 SPONTANEOUS ABORTION: ICD-10-CM

## 2025-03-31 LAB — HCG INTACT+B SERPL-ACNC: 69.04 MIU/ML

## 2025-03-31 PROCEDURE — 36415 COLL VENOUS BLD VENIPUNCTURE: CPT

## 2025-03-31 PROCEDURE — 84702 CHORIONIC GONADOTROPIN TEST: CPT

## 2025-04-01 DIAGNOSIS — O03.9 FOLLOW-UP VISIT AFTER MISCARRIAGE: Primary | ICD-10-CM

## 2025-04-01 DIAGNOSIS — Z51.89 FOLLOW-UP VISIT AFTER MISCARRIAGE: Primary | ICD-10-CM

## 2025-05-02 ENCOUNTER — PATIENT OUTREACH (OUTPATIENT)
Dept: ADMINISTRATIVE | Facility: HOSPITAL | Age: 25
End: 2025-05-02
Payer: COMMERCIAL

## 2025-06-02 ENCOUNTER — LAB VISIT (OUTPATIENT)
Dept: LAB | Facility: HOSPITAL | Age: 25
End: 2025-06-02
Attending: FAMILY MEDICINE
Payer: COMMERCIAL

## 2025-06-02 ENCOUNTER — CLINICAL SUPPORT (OUTPATIENT)
Dept: REHABILITATION | Facility: HOSPITAL | Age: 25
End: 2025-06-02
Payer: COMMERCIAL

## 2025-06-02 DIAGNOSIS — M25.612 DECREASED ROM OF LEFT SHOULDER: Primary | ICD-10-CM

## 2025-06-02 DIAGNOSIS — Z00.00 ANNUAL PHYSICAL EXAM: ICD-10-CM

## 2025-06-02 DIAGNOSIS — M62.81 MUSCLE WEAKNESS OF LEFT UPPER EXTREMITY: ICD-10-CM

## 2025-06-02 PROBLEM — M25.511 RIGHT SHOULDER PAIN: Status: RESOLVED | Noted: 2021-10-05 | Resolved: 2025-06-02

## 2025-06-02 LAB
ABSOLUTE EOSINOPHIL (OHS): 0.27 K/UL
ABSOLUTE MONOCYTE (OHS): 0.58 K/UL (ref 0.3–1)
ABSOLUTE NEUTROPHIL COUNT (OHS): 2.18 K/UL (ref 1.8–7.7)
ALBUMIN SERPL BCP-MCNC: 4 G/DL (ref 3.5–5.2)
ALP SERPL-CCNC: 55 UNIT/L (ref 40–150)
ALT SERPL W/O P-5'-P-CCNC: 14 UNIT/L (ref 10–44)
ANION GAP (OHS): 9 MMOL/L (ref 8–16)
AST SERPL-CCNC: 15 UNIT/L (ref 11–45)
BASOPHILS # BLD AUTO: 0.05 K/UL
BASOPHILS NFR BLD AUTO: 0.8 %
BILIRUB SERPL-MCNC: 0.3 MG/DL (ref 0.1–1)
BUN SERPL-MCNC: 10 MG/DL (ref 6–20)
CALCIUM SERPL-MCNC: 9 MG/DL (ref 8.7–10.5)
CHLORIDE SERPL-SCNC: 104 MMOL/L (ref 95–110)
CHOLEST SERPL-MCNC: 150 MG/DL (ref 120–199)
CHOLEST/HDLC SERPL: 2.3 {RATIO} (ref 2–5)
CO2 SERPL-SCNC: 28 MMOL/L (ref 23–29)
CREAT SERPL-MCNC: 0.9 MG/DL (ref 0.5–1.4)
EAG (OHS): 97 MG/DL (ref 68–131)
ERYTHROCYTE [DISTWIDTH] IN BLOOD BY AUTOMATED COUNT: 12.1 % (ref 11.5–14.5)
GFR SERPLBLD CREATININE-BSD FMLA CKD-EPI: >60 ML/MIN/1.73/M2
GLUCOSE SERPL-MCNC: 100 MG/DL (ref 70–110)
HBA1C MFR BLD: 5 % (ref 4–5.6)
HCT VFR BLD AUTO: 38 % (ref 37–48.5)
HDLC SERPL-MCNC: 66 MG/DL (ref 40–75)
HDLC SERPL: 44 % (ref 20–50)
HGB BLD-MCNC: 12.8 GM/DL (ref 12–16)
IMM GRANULOCYTES # BLD AUTO: 0.01 K/UL (ref 0–0.04)
IMM GRANULOCYTES NFR BLD AUTO: 0.2 % (ref 0–0.5)
LDLC SERPL CALC-MCNC: 68.6 MG/DL (ref 63–159)
LYMPHOCYTES # BLD AUTO: 2.97 K/UL (ref 1–4.8)
MCH RBC QN AUTO: 31.3 PG (ref 27–31)
MCHC RBC AUTO-ENTMCNC: 33.7 G/DL (ref 32–36)
MCV RBC AUTO: 93 FL (ref 82–98)
NONHDLC SERPL-MCNC: 84 MG/DL
NUCLEATED RBC (/100WBC) (OHS): 0 /100 WBC
PLATELET # BLD AUTO: 245 K/UL (ref 150–450)
PMV BLD AUTO: 12.7 FL (ref 9.2–12.9)
POTASSIUM SERPL-SCNC: 3.6 MMOL/L (ref 3.5–5.1)
PROT SERPL-MCNC: 7.1 GM/DL (ref 6–8.4)
RBC # BLD AUTO: 4.09 M/UL (ref 4–5.4)
RELATIVE EOSINOPHIL (OHS): 4.5 %
RELATIVE LYMPHOCYTE (OHS): 49 % (ref 18–48)
RELATIVE MONOCYTE (OHS): 9.6 % (ref 4–15)
RELATIVE NEUTROPHIL (OHS): 35.9 % (ref 38–73)
SODIUM SERPL-SCNC: 141 MMOL/L (ref 136–145)
TRIGL SERPL-MCNC: 77 MG/DL (ref 30–150)
TSH SERPL-ACNC: 2.14 UIU/ML (ref 0.4–4)
WBC # BLD AUTO: 6.06 K/UL (ref 3.9–12.7)

## 2025-06-02 PROCEDURE — 83036 HEMOGLOBIN GLYCOSYLATED A1C: CPT

## 2025-06-02 PROCEDURE — 97140 MANUAL THERAPY 1/> REGIONS: CPT

## 2025-06-02 PROCEDURE — 36415 COLL VENOUS BLD VENIPUNCTURE: CPT | Mod: PO

## 2025-06-02 PROCEDURE — 84443 ASSAY THYROID STIM HORMONE: CPT

## 2025-06-02 PROCEDURE — 82465 ASSAY BLD/SERUM CHOLESTEROL: CPT

## 2025-06-02 PROCEDURE — 84075 ASSAY ALKALINE PHOSPHATASE: CPT

## 2025-06-02 PROCEDURE — 97110 THERAPEUTIC EXERCISES: CPT

## 2025-06-02 PROCEDURE — 85025 COMPLETE CBC W/AUTO DIFF WBC: CPT

## 2025-06-02 PROCEDURE — 97161 PT EVAL LOW COMPLEX 20 MIN: CPT

## 2025-06-09 ENCOUNTER — OFFICE VISIT (OUTPATIENT)
Dept: FAMILY MEDICINE | Facility: CLINIC | Age: 25
End: 2025-06-09
Payer: COMMERCIAL

## 2025-06-09 ENCOUNTER — RESULTS FOLLOW-UP (OUTPATIENT)
Dept: FAMILY MEDICINE | Facility: CLINIC | Age: 25
End: 2025-06-09

## 2025-06-09 VITALS
SYSTOLIC BLOOD PRESSURE: 100 MMHG | WEIGHT: 141.13 LBS | DIASTOLIC BLOOD PRESSURE: 60 MMHG | HEIGHT: 65 IN | BODY MASS INDEX: 23.52 KG/M2

## 2025-06-09 DIAGNOSIS — F90.2 ADHD (ATTENTION DEFICIT HYPERACTIVITY DISORDER), COMBINED TYPE: ICD-10-CM

## 2025-06-09 DIAGNOSIS — F32.4 MAJOR DEPRESSIVE DISORDER, SINGLE EPISODE, IN PARTIAL REMISSION: ICD-10-CM

## 2025-06-09 DIAGNOSIS — Z00.00 ANNUAL PHYSICAL EXAM: Primary | ICD-10-CM

## 2025-06-09 DIAGNOSIS — M25.612 DECREASED ROM OF LEFT SHOULDER: ICD-10-CM

## 2025-06-09 DIAGNOSIS — J30.2 SEASONAL ALLERGIC RHINITIS, UNSPECIFIED TRIGGER: ICD-10-CM

## 2025-06-09 PROCEDURE — 99999 PR PBB SHADOW E&M-EST. PATIENT-LVL III: CPT | Mod: PBBFAC,,, | Performed by: FAMILY MEDICINE

## 2025-06-09 NOTE — PROGRESS NOTES
"Routine Office Visit     Patient Name: Yahaira Darling    : 2000  MRN: [id number]    Subjective     History of Present Illness    CHIEF COMPLAINT:  Patient presents today for follow up.    ALLERGIES AND ENVIRONMENTAL SENSITIVITIES:  She experiences facial swelling in the mornings attributed to mold exposure. Allergy testing was positive for environmental allergies, with dust being the highest sensitivity.    GERD:  She experiences heartburn and manages symptoms by sitting up.    SURGICAL AND MSK HISTORY:  She underwent left rotator cuff surgery on May 15th of this year and is currently completing physical therapy for her shoulder.    ADHD:  She currently takes Vyvanse for ADHD management and needs a refill. She confirms Vyvanse, not Adderall, is her current ADHD medication.    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      ROS:  General: no fever, no chills, no fatigue, no weight gain, no weight loss  Eyes: no vision changes, no redness, no discharge  ENT: no ear pain, no nasal congestion, no sore throat  Cardiovascular: no chest pain, no palpitations, no lower extremity edema  Respiratory: no cough, no shortness of breath  Gastrointestinal: no abdominal pain, no nausea, no vomiting, no diarrhea, no constipation, no blood in stool, +heartburn  Genitourinary: no dysuria, no hematuria, no frequency  Musculoskeletal: no joint pain, no muscle pain, +limb pain  Skin: no rash, no lesion  Neurological: no headache, no dizziness, no numbness, no tingling  Psychiatric: no anxiety, no depression, no sleep difficulty  Allergic: +facial swelling, +allergic reactions           Objective     /60 (BP Location: Left arm, Patient Position: Sitting)   Ht 5' 5" (1.651 m)   Wt 64 kg (141 lb 1.5 oz)   BMI 23.48 kg/m²   Physical Exam  Constitutional:       Appearance: She is well-developed.   HENT:      Head: " Normocephalic and atraumatic.   Eyes:      Conjunctiva/sclera: Conjunctivae normal.      Pupils: Pupils are equal, round, and reactive to light.   Cardiovascular:      Rate and Rhythm: Normal rate and regular rhythm.      Heart sounds: Normal heart sounds. No murmur heard.     No friction rub. No gallop.   Pulmonary:      Effort: No respiratory distress.      Breath sounds: Normal breath sounds.   Abdominal:      General: Bowel sounds are normal. There is no distension.      Palpations: Abdomen is soft.      Tenderness: There is no abdominal tenderness.   Musculoskeletal:         General: Normal range of motion.      Cervical back: Normal range of motion and neck supple.   Lymphadenopathy:      Cervical: No cervical adenopathy.   Skin:     General: Skin is warm.   Neurological:      Mental Status: She is alert and oriented to person, place, and time.           Assessment     Assessment & Plan      Seasonal allergic rhinitis, unspecified trigger    Identified facial edema in the morning, likely due to environmental allergens.   Dust is the patient's highest allergen, with mold as another possible trigger.   Reviewed recent allergist consultation for facial swelling.    Decreased ROM of left shoulder    Patient underwent rotator cuff repair on the left shoulder on May 15th and is currently participating in physical therapy for rehabilitation.        Problem List Items Addressed This Visit          Psychiatric    ADHD (attention deficit hyperactivity disorder), combined type   Assessed the patient's need for a Vyvanse refill.   Discussed the patient's plan to see a psychiatrist at the school clinic for easier access to medication management.      Major depressive disorder, single episode, in partial remission    Overview   Started around fall 2020 after multiple stressful events.          Other Visit Diagnoses         Annual physical exam    -  Primary    Relevant Orders    CBC Auto Differential    Comprehensive  Metabolic Panel    Lipid Panel    Hemoglobin A1C    TSH  Lab findings   Monitored neutrophil and lymphocyte counts, which show slight variations though absolute values remain within normal limits.   Complete blood count shows normal WBC count, absence of anemia, and slight macrocytosis that is not clinically significant.   A1C, thyroid function tests, electrolytes, protein levels, hepatic function, renal function, and lipid panel are all within normal limits.   Recommend maintaining a balanced nutritional intake to potentially normalize slight variations in blood counts.   Lipid panel shows excellent results with total cholesterol under 200 mg/dL, triglycerides at 150 mg/dL, elevated HDL, and low LDL.   The elevated HDL (beneficial cholesterol) provides cardiovascular protection, likely attributable to the patient's dietary habits and exercise regimen.   Patient to continue current diet and exercise regimen, particularly running track, as it is contributing to healthy cholesterol levels.                     This note was generated with the assistance of ambient listening technology. Verbal consent was obtained by the patient and accompanying visitor(s) for the recording of patient appointment to facilitate this note. I attest to having reviewed and edited the generated note for accuracy, though some syntax or spelling errors may persist. Please contact the author of this note for any clarification.

## 2025-06-10 ENCOUNTER — CLINICAL SUPPORT (OUTPATIENT)
Dept: REHABILITATION | Facility: HOSPITAL | Age: 25
End: 2025-06-10
Payer: COMMERCIAL

## 2025-06-10 DIAGNOSIS — M62.81 MUSCLE WEAKNESS OF LEFT UPPER EXTREMITY: ICD-10-CM

## 2025-06-10 DIAGNOSIS — M25.612 DECREASED ROM OF LEFT SHOULDER: Primary | ICD-10-CM

## 2025-06-10 PROCEDURE — 97110 THERAPEUTIC EXERCISES: CPT

## 2025-06-10 NOTE — PROGRESS NOTES
Outpatient Rehab    Physical Therapy Visit    Patient Name: Yahaira Darling  MRN: [id number]  YOB: 2000  Encounter Date: 6/10/2025    Therapy Diagnosis:   Encounter Diagnoses   Name Primary?    Decreased ROM of left shoulder Yes    Muscle weakness of left upper extremity      Physician: Arsalan Harrison MD    Physician Orders: Eval and Treat  Medical Diagnosis: Left shoulder pain  Surgical Diagnosis: unknown   Surgical Date: 5/15/2025    Visit # / Visits Authorized:  1 / 12  Insurance Authorization Period: 6/10/2025 to 9/30/2025  Date of Evaluation: 6/2/2025  Plan of Care Certification: 6/2/2025 to 12/2/2025      PT/PTA:     Number of PTA visits since last PT visit:   Time In: 1100   Time Out: 1140  Total Time (in minutes): 40   Total Billable Time (in minutes): 40    FOTO:  Intake Score:  %  Survey Score 2:  %  Survey Score 3:  %    Precautions:   DOS: 5/15/2025: 3 weeks, 5 days as of 6/10/2025  Left shoulder labral repair and remplissage     Per MD orders: okay for gentle passive range of motion and active range of motion. No forced passive range of motion or strengthening for 4 weeks.     Subjective   Yahaira reports she has been wearing her sling and doing her exercises. Just experiences some tightness in her shoulder. Going to the beach later this week..  Pain reported as 0/10.      Objective        Shoulder Passive Range of Motion within Protcol Limits:     Right Left   Flexion    180 100   ER at 0    95 30   ER at 90 120 120    NT   IR    70 50       Treatment:   Yaharia received the treatments listed below:      Therapeutic exercises to develop strength, endurance, ROM, flexibility, and posture for 30 minutes including:  Table slides - flexion and scaption 5 minutes each   Table walk backs 30x  Banded ER/IR walkouts OTB 3x10   Seated thoracic extensions 2x10     Pt education on procedure, precautions, timeline     Manual therapy techniques: Joint mobilizations and Soft tissue Mobilization were applied to  the: left shoulder for 10 minutes, including:  Oscillations   Passive range of motion within precautions   Light rhythmic stabilizations       Therapeutic activities to improve functional performance for 00 minutes, including:      Neuromuscular re-education activities to improve: Balance, Coordination, Kinesthetic, Sense, Proprioception, and Posture for 00 minutes. The following activities were included:       Time Entry(in minutes):  Manual Therapy Time Entry: 10  Therapeutic Exercise Time Entry: 40    Assessment & Plan   Assessment: Tea is 4 weeks s/p labral repair of left shoulder and demos good PROM at this point post-operatively. Progressed light RTC activation with good tolerance but fatigue noted. Pt advised to wear sling while at the beach, avoid submerging in water, and continue wearing sling until 6 weeks post-op. Will continue to gradually progress ROM and stabilization exercises as appropriate.  Evaluation/Treatment Tolerance: Patient tolerated treatment well    The patient will continue to benefit from skilled outpatient physical therapy in order to address the deficits listed in the problem list on the initial evaluation, provide patient and family education, and maximize the patients level of independence in the home and community environments.     The patient's spiritual, cultural, and educational needs were considered, and the patient is agreeable to the plan of care and goals.           Plan: gradually restore ROM s/p labral repair with remplissage    Goals:   Active       Long Term Goals       Pt to demo LSI >/= 90% of rotator cuff testing to progress through return to sport activities.        Start:  06/02/25    Expected End:  12/02/25            Pt to complete UE functional testing to met age and gender matched norms with appropriate LSI including but not limited to seated med ball throw, unilateral shot put test, posterior shoulder endurance test, and ball drop test.        Start:  06/02/25     Expected End:  12/02/25            Pt to demo unilateral push and push assessment with LSI >/= 100% to demo readiness to return to sport.        Start:  06/02/25    Expected End:  12/02/25            Pt to return to sport/track without limitations.        Start:  06/02/25    Expected End:  12/02/25               Mid Term Goals        Patient will improve their FOTO limitation score to at least predicted score as evidence of clinically significant improvements in their function.       Start:  06/02/25    Expected End:  12/02/25            Pt to demo normalized passive range of motion of left shoulder.        Start:  06/02/25    Expected End:  12/02/25            Pt to demo improvement in AROM of L shoulder within 95% of R shoulder to improve tolerance to OH movement and overall UE function.       Start:  06/02/25    Expected End:  12/02/25            Pt demo at least 4+/5 on muscle testing of scapular stabilizers and rotator cuff musculature to demo improvement in tolerance to activity.       Start:  06/02/25    Expected End:  12/02/25               Short Term Goals        Pt will be compliant with % of prescribed amount.        Start:  06/02/25    Expected End:  12/02/25            The pt to demo improvement in L shoulder PROM to by 80% of the R shoulder.        Start:  06/02/25    Expected End:  12/02/25             The pt to demo tolerance to being out of the sling for 24 hours with pain <2/10       Start:  06/02/25    Expected End:  12/02/25                Heidi Hunt, PT, DPT

## 2025-06-17 ENCOUNTER — CLINICAL SUPPORT (OUTPATIENT)
Dept: REHABILITATION | Facility: HOSPITAL | Age: 25
End: 2025-06-17
Payer: COMMERCIAL

## 2025-06-17 DIAGNOSIS — M62.81 MUSCLE WEAKNESS OF LEFT UPPER EXTREMITY: ICD-10-CM

## 2025-06-17 DIAGNOSIS — M25.612 DECREASED ROM OF LEFT SHOULDER: Primary | ICD-10-CM

## 2025-06-17 PROCEDURE — 97112 NEUROMUSCULAR REEDUCATION: CPT

## 2025-06-17 PROCEDURE — 97140 MANUAL THERAPY 1/> REGIONS: CPT

## 2025-06-17 PROCEDURE — 97110 THERAPEUTIC EXERCISES: CPT

## 2025-06-17 NOTE — PROGRESS NOTES
Outpatient Rehab    Physical Therapy Visit    Patient Name: Yahaira Darling  MRN: [id number]  YOB: 2000  Encounter Date: 6/17/2025    Therapy Diagnosis:   Encounter Diagnoses   Name Primary?    Decreased ROM of left shoulder Yes    Muscle weakness of left upper extremity      Physician: Arsalan Harrison MD    Physician Orders: Eval and Treat  Medical Diagnosis: Left shoulder pain  Surgical Diagnosis: unknown   Surgical Date: 5/15/2025    Visit # / Visits Authorized:  2 / 12  Insurance Authorization Period: 6/10/2025 to 9/30/2025  Date of Evaluation: 6/2/2025  Plan of Care Certification: 6/2/2025 to 12/2/2025      PT/PTA:     Number of PTA visits since last PT visit:   Time In: 1015   Time Out: 1100  Total Time (in minutes): 45   Total Billable Time (in minutes): 45    FOTO:  Intake Score:  %  Survey Score 2:  %  Survey Score 3:  %    Precautions:   DOS: 5/15/2025: 4 weeks, 5 days as of 6/10/2025  Left shoulder labral repair and remplissage     Per MD orders: okay for gentle passive range of motion and active range of motion. No forced passive range of motion or strengthening for 4 weeks.     Subjective   Yahaira reports she did drive to the beach without her sling (3 hours) last week. Shoulder was fatigued but no pain. Only complaint is that her scar burns. Did her exercises while on vacation..  Pain reported as 0/10.      Objective        Shoulder Passive Range of Motion within Protcol Limits:     Right Left   Flexion    180 120   ER at 0    95 40   ER at 90 120 120    NT   IR    70 50       Treatment:   Yahaira received the treatments listed below:      Therapeutic exercises to develop strength, endurance, ROM, flexibility, and posture for 20 minutes including:  Supine dowel ER 30x   Supine dowel press to flexion 30x  Table walk backs 30x    Not today:  Table slides - flexion and scaption 5 minutes each   Seated thoracic extensions 2x10   Pt education on procedure, precautions, timeline     Manual therapy  "techniques: Joint mobilizations and Soft tissue Mobilization were applied to the: left shoulder for 10 minutes, including:  Oscillations   Passive range of motion within precautions   Light rhythmic stabilizations ER/IR neutral and 90 flexion       Therapeutic activities to improve functional performance for 00 minutes, including:      Neuromuscular re-education activities to improve: Balance, Coordination, Kinesthetic, Sense, Proprioception, and Posture for 15 minutes. The following activities were included:     Supine rhythmic stabilizations YTB 3x10 each at 45- deg flexion   Banded ER/IR walkouts OTB 3x10   Prone row 3x10x5"       Time Entry(in minutes):  Manual Therapy Time Entry: 10  Neuromuscular Re-Education Time Entry: 15  Therapeutic Exercise Time Entry: 20    Assessment & Plan   Assessment: Tea is approximately 5 weeks s/p labral repair of left shoulder and is progressing well with PROM at this point post-operatively. Continued with rhythmic stabilization progressions/isometrics for RTC and progressed scapular stabilization exercises with fatigue but good tolerance. Pt advised to add banded walkouts to HEP. Will continue to gradually progress ROM and stabilization exercises as appropriate.  Evaluation/Treatment Tolerance: Patient tolerated treatment well    The patient will continue to benefit from skilled outpatient physical therapy in order to address the deficits listed in the problem list on the initial evaluation, provide patient and family education, and maximize the patients level of independence in the home and community environments.     The patient's spiritual, cultural, and educational needs were considered, and the patient is agreeable to the plan of care and goals.           Plan: gradually restore ROM s/p labral repair with remplissage    Goals:   Active       Long Term Goals       Pt to demo LSI >/= 90% of rotator cuff testing to progress through return to sport activities.        " Start:  06/02/25    Expected End:  12/02/25            Pt to complete UE functional testing to met age and gender matched norms with appropriate LSI including but not limited to seated med ball throw, unilateral shot put test, posterior shoulder endurance test, and ball drop test.        Start:  06/02/25    Expected End:  12/02/25            Pt to demo unilateral push and push assessment with LSI >/= 100% to demo readiness to return to sport.        Start:  06/02/25    Expected End:  12/02/25            Pt to return to sport/track without limitations.        Start:  06/02/25    Expected End:  12/02/25               Mid Term Goals        Patient will improve their FOTO limitation score to at least predicted score as evidence of clinically significant improvements in their function.       Start:  06/02/25    Expected End:  12/02/25            Pt to demo normalized passive range of motion of left shoulder.        Start:  06/02/25    Expected End:  12/02/25            Pt to demo improvement in AROM of L shoulder within 95% of R shoulder to improve tolerance to OH movement and overall UE function.       Start:  06/02/25    Expected End:  12/02/25            Pt demo at least 4+/5 on muscle testing of scapular stabilizers and rotator cuff musculature to demo improvement in tolerance to activity.       Start:  06/02/25    Expected End:  12/02/25               Short Term Goals        Pt will be compliant with % of prescribed amount.        Start:  06/02/25    Expected End:  12/02/25            The pt to demo improvement in L shoulder PROM to by 80% of the R shoulder.        Start:  06/02/25    Expected End:  12/02/25             The pt to demo tolerance to being out of the sling for 24 hours with pain <2/10       Start:  06/02/25    Expected End:  12/02/25                Heidi Hunt, PT, DPT

## 2025-06-19 ENCOUNTER — CLINICAL SUPPORT (OUTPATIENT)
Dept: REHABILITATION | Facility: HOSPITAL | Age: 25
End: 2025-06-19
Payer: COMMERCIAL

## 2025-06-19 DIAGNOSIS — M62.81 MUSCLE WEAKNESS OF LEFT UPPER EXTREMITY: ICD-10-CM

## 2025-06-19 DIAGNOSIS — M25.612 DECREASED ROM OF LEFT SHOULDER: Primary | ICD-10-CM

## 2025-06-19 PROCEDURE — 97110 THERAPEUTIC EXERCISES: CPT

## 2025-06-19 PROCEDURE — 97112 NEUROMUSCULAR REEDUCATION: CPT

## 2025-06-19 PROCEDURE — 97140 MANUAL THERAPY 1/> REGIONS: CPT

## 2025-06-19 NOTE — PROGRESS NOTES
Outpatient Rehab    Physical Therapy Visit    Patient Name: Yahaira Darling  MRN: [id number]  YOB: 2000  Encounter Date: 6/19/2025    Therapy Diagnosis:   Encounter Diagnoses   Name Primary?    Decreased ROM of left shoulder Yes    Muscle weakness of left upper extremity      Physician: Arsalan Harrison MD    Physician Orders: Eval and Treat  Medical Diagnosis: Left shoulder pain  Surgical Diagnosis: unknown   Surgical Date: 5/15/2025    Visit # / Visits Authorized:  3 / 12  Insurance Authorization Period: 6/10/2025 to 9/30/2025  Date of Evaluation: 6/2/2025  Plan of Care Certification: 6/2/2025 to 12/2/2025      PT/PTA:     Number of PTA visits since last PT visit:   Time In: 1006   Time Out: 1100  Total Time (in minutes): 54   Total Billable Time (in minutes): 54    FOTO:  Intake Score:  %  Survey Score 2:  %  Survey Score 3:  %    Precautions:   DOS: 5/15/2025: 5 weeks, 0 days as of 6/19/2025  Left shoulder labral repair and remplissage     Per MD orders: okay for gentle passive range of motion and active range of motion. No forced passive range of motion or strengthening for 4 weeks.     Subjective   Yahaira reports some anterior and lateral soreness of her shoulder but no pain. Sees the doctor on the 26th (next week)..  Pain reported as 0/10.      Objective        Shoulder Passive Range of Motion within Protcol Limits:     Right Left   Flexion    180 120   ER at 0    95 40   ER at 90  120    NT   IR    70 50     Shoulder Active Range of Motion within Protcol Limits:     Right Left   Flexion    180 120   ER at 0    95 30   ER at 90  120    NT   IR    70 50       Treatment:   Yahaira received the treatments listed below:      Therapeutic exercises to develop strength, endurance, ROM, flexibility, and posture for 19 minutes including:  Supine dowel ER 30x   Supine dowel press to flexion 30x  Table walk backs 30x    Not today:  Table slides - flexion and scaption 5 minutes each   Seated thoracic extensions 2x10  "  Pt education on procedure, precautions, timeline     Manual therapy techniques: Joint mobilizations and Soft tissue Mobilization were applied to the: left shoulder for 10 minutes, including:  Oscillations   Passive range of motion within precautions   Light rhythmic stabilizations ER/IR neutral and 90 flexion       Therapeutic activities to improve functional performance for 00 minutes, including:      Neuromuscular re-education activities to improve: Balance, Coordination, Kinesthetic, Sense, Proprioception, and Posture for 25 minutes. The following activities were included:     Supine rhythmic stabilizations YTB 3x10 each at 45- deg flexion   Banded ER/IR walkouts OTB 3x10   Side lying ER 3x10x5"   Prone row 3x10x5"   Prone Is 3x10x5"       Time Entry(in minutes):  Manual Therapy Time Entry: 10  Neuromuscular Re-Education Time Entry: 25  Therapeutic Exercise Time Entry: 19    Assessment & Plan   Assessment: Tea is approximately 5 weeks s/p labral repair of left shoulder. She meets passive and active range of motion expectations at this point post-operatively. Continued with rhythmic stabilization progressions/isometrics for RTC and progressed scapular stabilization exercises with fatigue but good tolerance. Will continue to gradually progress ROM and stabilization exercises as appropriate.  Evaluation/Treatment Tolerance: Patient tolerated treatment well    The patient will continue to benefit from skilled outpatient physical therapy in order to address the deficits listed in the problem list on the initial evaluation, provide patient and family education, and maximize the patients level of independence in the home and community environments.     The patient's spiritual, cultural, and educational needs were considered, and the patient is agreeable to the plan of care and goals.           Plan: gradually restore ROM s/p labral repair with remplissage    Goals:   Active       Long Term Goals       Pt to " demo LSI >/= 90% of rotator cuff testing to progress through return to sport activities.        Start:  06/02/25    Expected End:  12/02/25            Pt to complete UE functional testing to met age and gender matched norms with appropriate LSI including but not limited to seated med ball throw, unilateral shot put test, posterior shoulder endurance test, and ball drop test.        Start:  06/02/25    Expected End:  12/02/25            Pt to demo unilateral push and push assessment with LSI >/= 100% to demo readiness to return to sport.        Start:  06/02/25    Expected End:  12/02/25            Pt to return to sport/track without limitations.        Start:  06/02/25    Expected End:  12/02/25               Mid Term Goals        Patient will improve their FOTO limitation score to at least predicted score as evidence of clinically significant improvements in their function.       Start:  06/02/25    Expected End:  12/02/25            Pt to demo normalized passive range of motion of left shoulder.        Start:  06/02/25    Expected End:  12/02/25            Pt to demo improvement in AROM of L shoulder within 95% of R shoulder to improve tolerance to OH movement and overall UE function.       Start:  06/02/25    Expected End:  12/02/25            Pt demo at least 4+/5 on muscle testing of scapular stabilizers and rotator cuff musculature to demo improvement in tolerance to activity.       Start:  06/02/25    Expected End:  12/02/25               Short Term Goals        Pt will be compliant with % of prescribed amount.        Start:  06/02/25    Expected End:  12/02/25            The pt to demo improvement in L shoulder PROM to by 80% of the R shoulder.        Start:  06/02/25    Expected End:  12/02/25             The pt to demo tolerance to being out of the sling for 24 hours with pain <2/10       Start:  06/02/25    Expected End:  12/02/25                Heidi Hunt, PT, DPT

## 2025-06-24 ENCOUNTER — CLINICAL SUPPORT (OUTPATIENT)
Dept: REHABILITATION | Facility: HOSPITAL | Age: 25
End: 2025-06-24
Payer: COMMERCIAL

## 2025-06-24 DIAGNOSIS — M62.81 MUSCLE WEAKNESS OF LEFT UPPER EXTREMITY: ICD-10-CM

## 2025-06-24 DIAGNOSIS — M25.612 DECREASED ROM OF LEFT SHOULDER: Primary | ICD-10-CM

## 2025-06-24 PROCEDURE — 97530 THERAPEUTIC ACTIVITIES: CPT

## 2025-06-24 PROCEDURE — 97112 NEUROMUSCULAR REEDUCATION: CPT

## 2025-06-24 PROCEDURE — 97140 MANUAL THERAPY 1/> REGIONS: CPT

## 2025-06-24 PROCEDURE — 97110 THERAPEUTIC EXERCISES: CPT

## 2025-06-24 NOTE — PROGRESS NOTES
Outpatient Rehab    Physical Therapy Visit    Patient Name: Yahaira Darling  MRN: [id number]  YOB: 2000  Encounter Date: 6/24/2025    Therapy Diagnosis:   Encounter Diagnoses   Name Primary?    Decreased ROM of left shoulder Yes    Muscle weakness of left upper extremity      Physician: Arsalan Harrison MD    Physician Orders: Eval and Treat  Medical Diagnosis: Left shoulder pain  Surgical Diagnosis: unknown   Surgical Date: 5/15/2025    Visit # / Visits Authorized:  4 / 12  Insurance Authorization Period: 6/10/2025 to 9/30/2025  Date of Evaluation: 6/2/2025  Plan of Care Certification: 6/2/2025 to 12/2/2025      PT/PTA:     Number of PTA visits since last PT visit:   Time In: 1415   Time Out: 1510  Total Time (in minutes): 55   Total Billable Time (in minutes): 55    FOTO:  Intake Score:  %  Survey Score 2:  %  Survey Score 3:  %    Precautions:   DOS: 5/15/2025: 5 weeks, 5 days as of 6/24/2025  Left shoulder labral repair and remplissage     Per MD orders: okay for gentle passive range of motion and active range of motion. No forced passive range of motion or strengthening for 4 weeks.     Subjective   Yahaira reports shoulder doing okay, just feels tired. Her surgical follow up is later this week..  Pain reported as 0/10.      Objective        Shoulder Passive Range of Motion within Protcol Limits:    Right Left   Flexion    180 140   ER at 0    95 45   ER at 90  120    NT   IR    70 50     Shoulder Active Range of Motion within Protcol Limits:    Right Left   Flexion    180 120   ER at 0    95 30   ER at 90  120    NT   IR    70 50       Treatment:   Yahaira received the treatments listed below:      Therapeutic exercises to develop strength, endurance, ROM, flexibility, and posture for 10 minutes including:  Supine dowel ER 30x   Supine dowel press to flexion 30x  Table walk backs 30x    Not today:  Table slides - flexion and scaption 5 minutes each   Seated thoracic extensions 2x10   Pt education on  "procedure, precautions, timeline     Manual therapy techniques: Joint mobilizations and Soft tissue Mobilization were applied to the: left shoulder for 08 minutes, including:  Oscillations   Passive range of motion within precautions   Light rhythmic stabilizations ER/IR neutral and 90 flexion       Therapeutic activities to improve functional performance for 15 minutes, including:  Upright bike 8 minutes   Wall sits 5x30"       Neuromuscular re-education activities to improve: Balance, Coordination, Kinesthetic, Sense, Proprioception, and Posture for 22 minutes. The following activities were included:     Supine rhythmic stabilizations YTB 3x10 each at 45- deg flexion   Banded ER/IR walkouts GTB 3x10   Side lying ER 3x10x5"   Prone row 3x10x5"   Prone Is 3x10x5"       Time Entry(in minutes):  Manual Therapy Time Entry: 8  Neuromuscular Re-Education Time Entry: 22  Therapeutic Activity Time Entry: 15  Therapeutic Exercise Time Entry: 10    Assessment & Plan   Assessment: Tea is approximately 6 weeks s/p labral repair of left shoulder. She meets passive and active range of motion expectations at this point post-operatively and is doing well with low level rhythmic stabilization and scapular stabilization exercises. Incorporated lower extremity conditioning to prevent further deconditioning as she recovers from shoulder surgery. Will continue to gradually progress ROM and stabilization exercises as appropriate.  Evaluation/Treatment Tolerance: Patient tolerated treatment well    The patient will continue to benefit from skilled outpatient physical therapy in order to address the deficits listed in the problem list on the initial evaluation, provide patient and family education, and maximize the patients level of independence in the home and community environments.     The patient's spiritual, cultural, and educational needs were considered, and the patient is agreeable to the plan of care and goals.       "     Plan: gradually restore ROM s/p labral repair with remplissage    Goals:   Active       Long Term Goals       Pt to demo LSI >/= 90% of rotator cuff testing to progress through return to sport activities.        Start:  06/02/25    Expected End:  12/02/25            Pt to complete UE functional testing to met age and gender matched norms with appropriate LSI including but not limited to seated med ball throw, unilateral shot put test, posterior shoulder endurance test, and ball drop test.        Start:  06/02/25    Expected End:  12/02/25            Pt to demo unilateral push and push assessment with LSI >/= 100% to demo readiness to return to sport.        Start:  06/02/25    Expected End:  12/02/25            Pt to return to sport/track without limitations.        Start:  06/02/25    Expected End:  12/02/25               Mid Term Goals        Patient will improve their FOTO limitation score to at least predicted score as evidence of clinically significant improvements in their function.       Start:  06/02/25    Expected End:  12/02/25            Pt to demo normalized passive range of motion of left shoulder.        Start:  06/02/25    Expected End:  12/02/25            Pt to demo improvement in AROM of L shoulder within 95% of R shoulder to improve tolerance to OH movement and overall UE function.       Start:  06/02/25    Expected End:  12/02/25            Pt demo at least 4+/5 on muscle testing of scapular stabilizers and rotator cuff musculature to demo improvement in tolerance to activity.       Start:  06/02/25    Expected End:  12/02/25               Short Term Goals        Pt will be compliant with % of prescribed amount.        Start:  06/02/25    Expected End:  12/02/25            The pt to demo improvement in L shoulder PROM to by 80% of the R shoulder.        Start:  06/02/25    Expected End:  12/02/25             The pt to demo tolerance to being out of the sling for 24 hours with pain  <2/10       Start:  06/02/25    Expected End:  12/02/25                Heidi Hunt PT, DPT

## 2025-07-06 NOTE — PROGRESS NOTES
"  Outpatient Rehab    Physical Therapy Visit    Patient Name: Yahaira Darling  MRN: [id number]  YOB: 2000  Encounter Date: 7/7/2025    Therapy Diagnosis:   Encounter Diagnoses   Name Primary?    Decreased ROM of left shoulder Yes    Muscle weakness of left upper extremity        Physician: Arsalan Harrison MD    Physician Orders: Eval and Treat  Medical Diagnosis: Left shoulder pain  Surgical Diagnosis: unknown   Surgical Date: 5/15/2025    Visit # / Visits Authorized:  5 / 12  Insurance Authorization Period: 6/10/2025 to 9/30/2025  Date of Evaluation: 6/2/2025  Plan of Care Certification: 6/2/2025 to 12/2/2025      PT/PTA:     Number of PTA visits since last PT visit:   Time In: 1538   Time Out: 1630  Total Time (in minutes): 52   Total Billable Time (in minutes): 52    FOTO:  Intake Score: 47%  Survey Score 2: 52%  Survey Score 3:  %    Precautions:   DOS: 5/15/2025: 7 weeks, 3 days as of 7/7/2025  Left shoulder labral repair and remplissage     Per MD orders: okay for gentle passive range of motion and active range of motion. No forced passive range of motion or strengthening for 4 weeks.     Subjective   Yahaira reports she didn't do as much exercises while on vacation. Had follow up with surgeon and she was told to continue working on ROM..  Pain reported as 0/10.      Objective        Shoulder Passive Range of Motion within Protcol Limits:    Right Left   Flexion    180 150   ER at 0    95 45   ER at 90  120    NT   IR    70 50     Shoulder Active Range of Motion within Protcol Limits:    Right Left   Flexion    180 144   ER at 0    95 40   ER at 90  120    NT   IR    70 50       Treatment:   Yahaira received the treatments listed below:      Therapeutic exercises to develop strength, endurance, ROM, flexibility, and posture for 15 minutes including:  Supine dowel ER 30x   Supine dowel press to flexion 30x  Hand heel rock backs 30x5"     Not today:  Table slides - flexion and scaption 5 minutes each   Seated " "thoracic extensions 2x10   Pt education on procedure, precautions, timeline   Table walk backs 30x    Manual therapy techniques: Joint mobilizations and Soft tissue Mobilization were applied to the: left shoulder for 08 minutes, including:  Oscillations   Passive range of motion within precautions   Light rhythmic stabilizations ER/IR neutral and 90 flexion       Therapeutic activities to improve functional performance for 00 minutes, including:  Not today:  Upright bike 8 minutes   Wall sits 5x30"       Neuromuscular re-education activities to improve: Balance, Coordination, Kinesthetic, Sense, Proprioception, and Posture for 29 minutes. The following activities were included:     Standing rhythmic stabilizations YTB 3x10 each at 45- deg flexion   Banded ER/IR walkouts GTB 3x10   Ball on wall ABCs x3   Body blade ER/IR, flexion/extension 3x30" each   Side lying ER 3x10x5"     Not today:  Prone row 3x10x5"   Prone Is 3x10x5"       Time Entry(in minutes):  Manual Therapy Time Entry: 8  Neuromuscular Re-Education Time Entry: 29  Therapeutic Activity Time Entry: 15    Assessment & Plan   Assessment: Tea is 7 weeks s/p labral repair of left shoulder. Demos improvement in passive and active range of motion which meets expectations at this point post-operatively. Progressed rhythmic stabilization and scapular stabilization exercises with good tolerance. Will continue to gradually progress ROM and stabilization exercises as appropriate. Expect full range of motion at 12 weeks.  Evaluation/Treatment Tolerance: Patient tolerated treatment well    The patient will continue to benefit from skilled outpatient physical therapy in order to address the deficits listed in the problem list on the initial evaluation, provide patient and family education, and maximize the patients level of independence in the home and community environments.     The patient's spiritual, cultural, and educational needs were considered, and the " patient is agreeable to the plan of care and goals.           Plan: gradually restore ROM s/p labral repair with remplissage    Goals:   Active       Long Term Goals       Pt to demo LSI >/= 90% of rotator cuff testing to progress through return to sport activities.        Start:  06/02/25    Expected End:  12/02/25            Pt to complete UE functional testing to met age and gender matched norms with appropriate LSI including but not limited to seated med ball throw, unilateral shot put test, posterior shoulder endurance test, and ball drop test.        Start:  06/02/25    Expected End:  12/02/25            Pt to demo unilateral push and push assessment with LSI >/= 100% to demo readiness to return to sport.        Start:  06/02/25    Expected End:  12/02/25            Pt to return to sport/track without limitations.        Start:  06/02/25    Expected End:  12/02/25               Mid Term Goals        Patient will improve their FOTO limitation score to at least predicted score as evidence of clinically significant improvements in their function.       Start:  06/02/25    Expected End:  12/02/25            Pt to demo normalized passive range of motion of left shoulder.        Start:  06/02/25    Expected End:  12/02/25            Pt to demo improvement in AROM of L shoulder within 95% of R shoulder to improve tolerance to OH movement and overall UE function.       Start:  06/02/25    Expected End:  12/02/25            Pt demo at least 4+/5 on muscle testing of scapular stabilizers and rotator cuff musculature to demo improvement in tolerance to activity.       Start:  06/02/25    Expected End:  12/02/25               Short Term Goals        Pt will be compliant with % of prescribed amount.        Start:  06/02/25    Expected End:  12/02/25            The pt to demo improvement in L shoulder PROM to by 80% of the R shoulder.        Start:  06/02/25    Expected End:  12/02/25             The pt to demo  tolerance to being out of the sling for 24 hours with pain <2/10       Start:  06/02/25    Expected End:  12/02/25                  Heidi Hunt, PT, DPT

## 2025-07-07 ENCOUNTER — CLINICAL SUPPORT (OUTPATIENT)
Dept: REHABILITATION | Facility: HOSPITAL | Age: 25
End: 2025-07-07
Payer: COMMERCIAL

## 2025-07-07 DIAGNOSIS — M62.81 MUSCLE WEAKNESS OF LEFT UPPER EXTREMITY: ICD-10-CM

## 2025-07-07 DIAGNOSIS — M25.612 DECREASED ROM OF LEFT SHOULDER: Primary | ICD-10-CM

## 2025-07-07 PROCEDURE — 97530 THERAPEUTIC ACTIVITIES: CPT

## 2025-07-07 PROCEDURE — 97112 NEUROMUSCULAR REEDUCATION: CPT

## 2025-07-15 ENCOUNTER — CLINICAL SUPPORT (OUTPATIENT)
Dept: REHABILITATION | Facility: HOSPITAL | Age: 25
End: 2025-07-15
Payer: COMMERCIAL

## 2025-07-15 DIAGNOSIS — M25.612 DECREASED ROM OF LEFT SHOULDER: Primary | ICD-10-CM

## 2025-07-15 DIAGNOSIS — M62.81 MUSCLE WEAKNESS OF LEFT UPPER EXTREMITY: ICD-10-CM

## 2025-07-15 PROCEDURE — 97112 NEUROMUSCULAR REEDUCATION: CPT

## 2025-07-15 PROCEDURE — 97110 THERAPEUTIC EXERCISES: CPT

## 2025-07-15 PROCEDURE — 97140 MANUAL THERAPY 1/> REGIONS: CPT

## 2025-07-15 NOTE — PROGRESS NOTES
"  Outpatient Rehab    Physical Therapy Progress Note    Patient Name: Yahaira Darling  MRN: [id number]  YOB: 2000  Encounter Date: 7/15/2025    Therapy Diagnosis:   Encounter Diagnoses   Name Primary?    Decreased ROM of left shoulder Yes    Muscle weakness of left upper extremity      Physician: Arsalan Harrison MD    Physician Orders: Eval and Treat  Medical Diagnosis: Left shoulder pain  Surgical Diagnosis: unknown   Surgical Date: 5/15/2025  Days Since Last Surgery: 61    Visit # / Visits Authorized:  6 / 12  Insurance Authorization Period: 6/10/2025 to 9/30/2025  Date of Evaluation: 6/2/2025  Plan of Care Certification: 6/2/2025 to 12/2/2025      PT/PTA:     Number of PTA visits since last PT visit:   Time In: 1306   Time Out: 1400  Total Time (in minutes): 54   Total Billable Time (in minutes): 54    FOTO:  Intake Score: 47%  Survey Score 2: 52%  Survey Score 3: Not applicable for this Episode%    Precautions:    DOS: 5/15/2025: 8 weeks, 4 days as of 7/15/2025  Left shoulder labral repair and remplissage      Per MD orders: okay for gentle passive range of motion and active range of motion. No forced passive range of motion or strengthening for 4 weeks.    Subjective   Yahaira reports her shoulder feels okay, just occasional sharp pain with different movements. Only doing the bands at home..  Pain reported as 0/10.      Objective        Shoulder Passive Range of Motion within Protcol Limits:    Right Left   Flexion    180 150   ER at 0    95 45   ER at 90  120    NT   IR    70 50      Shoulder Active Range of Motion within Protcol Limits:    Right Left   Flexion    180 144   ER at 0    95 30   ER at 90  120    NT   IR    70 50       Treatment:   Yahaira received the treatments listed below:       Therapeutic exercises to develop strength, endurance, ROM, flexibility, and posture for 10 minutes including:  Supine dowel ER 30x5"   Supine dowel press to flexion 30x  Hand heel rock backs 30x5"      Not " "today:  Table slides - flexion and scaption 5 minutes each   Seated thoracic extensions 2x10   Pt education on procedure, precautions, timeline   Table walk backs 30x     Manual therapy techniques: Joint mobilizations and Soft tissue Mobilization were applied to the: left shoulder for 08 minutes, including:  Oscillations   Passive range of motion within precautions   Light rhythmic stabilizations ER/IR neutral and 90 flexion         Therapeutic activities to improve functional performance for 00 minutes, including:  Not today:  Upright bike 8 minutes   Wall sits 5x30"         Neuromuscular re-education activities to improve: Balance, Coordination, Kinesthetic, Sense, Proprioception, and Posture for 36 minutes. The following activities were included:      Sidelying ER 30x5"   Prone row 3x10x5" w/ 4#   Prone Is 3x10x5" w/ 4#   Banded ER/IR walkouts GTB 3x10   Ball on wall ABCs x3   Side lying ER 3x10x5"      Not today:  Standing rhythmic stabilizations YTB 3x10 each at 45- deg flexion   Body blade ER/IR, flexion/extension 3x30" each     Time Entry(in minutes):  Manual Therapy Time Entry: 8  Neuromuscular Re-Education Time Entry: 36  Therapeutic Exercise Time Entry: 10    Assessment & Plan   Assessment: Tea is 8 weeks s/p labral repair of left shoulder. Demos improvement in passive and active range of motion which is meeting expectations at this point post-operatively. Expect full range of motion at 12 weeks. Will continue to gradually progress range of motion, muscular endurance, and neuromuscular control as appropriate.   Evaluation/Treatment Tolerance: Patient tolerated treatment well    The patient will continue to benefit from skilled outpatient physical therapy in order to address the deficits listed in the problem list on the initial evaluation, provide patient and family education, and maximize the patients level of independence in the home and community environments.     The patient's spiritual, " cultural, and educational needs were considered, and the patient is agreeable to the plan of care and goals.           Plan: gradually restore ROM s/p labral repair with remplissage    Goals:   Active       Long Term Goals       Pt to demo LSI >/= 90% of rotator cuff testing to progress through return to sport activities.        Start:  06/02/25    Expected End:  12/02/25            Pt to complete UE functional testing to met age and gender matched norms with appropriate LSI including but not limited to seated med ball throw, unilateral shot put test, posterior shoulder endurance test, and ball drop test.        Start:  06/02/25    Expected End:  12/02/25            Pt to demo unilateral push and push assessment with LSI >/= 100% to demo readiness to return to sport.        Start:  06/02/25    Expected End:  12/02/25            Pt to return to sport/track without limitations.        Start:  06/02/25    Expected End:  12/02/25               Mid Term Goals        Patient will improve their FOTO limitation score to at least predicted score as evidence of clinically significant improvements in their function.       Start:  06/02/25    Expected End:  12/02/25            Pt to demo normalized passive range of motion of left shoulder.        Start:  06/02/25    Expected End:  12/02/25            Pt to demo improvement in AROM of L shoulder within 95% of R shoulder to improve tolerance to OH movement and overall UE function.       Start:  06/02/25    Expected End:  12/02/25            Pt demo at least 4+/5 on muscle testing of scapular stabilizers and rotator cuff musculature to demo improvement in tolerance to activity.       Start:  06/02/25    Expected End:  12/02/25               Short Term Goals        Pt will be compliant with % of prescribed amount.        Start:  06/02/25    Expected End:  12/02/25            The pt to demo improvement in L shoulder PROM to by 80% of the R shoulder.        Start:  06/02/25     Expected End:  12/02/25             The pt to demo tolerance to being out of the sling for 24 hours with pain <2/10       Start:  06/02/25    Expected End:  12/02/25                Heidi Hunt, PT, DPT, SCS

## 2025-07-18 ENCOUNTER — CLINICAL SUPPORT (OUTPATIENT)
Dept: REHABILITATION | Facility: HOSPITAL | Age: 25
End: 2025-07-18
Payer: COMMERCIAL

## 2025-07-18 DIAGNOSIS — M25.612 DECREASED ROM OF LEFT SHOULDER: Primary | ICD-10-CM

## 2025-07-18 DIAGNOSIS — M62.81 MUSCLE WEAKNESS OF LEFT UPPER EXTREMITY: ICD-10-CM

## 2025-07-18 PROCEDURE — 97110 THERAPEUTIC EXERCISES: CPT

## 2025-07-18 PROCEDURE — 97140 MANUAL THERAPY 1/> REGIONS: CPT

## 2025-07-18 PROCEDURE — 97112 NEUROMUSCULAR REEDUCATION: CPT

## 2025-07-18 NOTE — PROGRESS NOTES
"  Outpatient Rehab    Physical Therapy Visit    Patient Name: Yahaira Darling  MRN: [id number]  YOB: 2000  Encounter Date: 7/18/2025    Therapy Diagnosis:   Encounter Diagnoses   Name Primary?    Decreased ROM of left shoulder Yes    Muscle weakness of left upper extremity      Physician: Arsalan Harrison MD    Physician Orders: Eval and Treat  Medical Diagnosis: Left shoulder pain  Surgical Diagnosis: unknown   Surgical Date: 5/15/2025  Days Since Last Surgery: 64    Visit # / Visits Authorized:  7 / 12  Insurance Authorization Period: 6/10/2025 to 9/30/2025  Date of Evaluation: 6/2/2025  Plan of Care Certification: 6/2/2025 to 12/2/2025      PT/PTA:     Number of PTA visits since last PT visit:   Time In: 1345   Time Out: 1430  Total Time (in minutes): 45   Total Billable Time (in minutes): 45    FOTO:  Intake Score (%): 47  Survey Score 2 (%): 52  Survey Score 3 (%): Not applicable for this Episode    Precautions:     DOS: 5/15/2025: 9 weeks, 0 days as of 7/18/2025  Left shoulder labral repair and remplissage      Per MD orders: okay for gentle passive range of motion and active range of motion. No forced passive range of motion or strengthening for 4 weeks.      Subjective   Yahaira reports her shoulder maggie hurt this morning but fine now. New exercises are tiring..  Pain reported as 0/10.      Objective        Shoulder Passive Range of Motion within Protcol Limits:    Right Left   Flexion    180 150   ER at 0    95 45   ER at 90  120    NT   IR    70 50      Shoulder Active Range of Motion within Protcol Limits:    Right Left   Flexion    180 144   ER at 0    95 30   ER at 90  120    NT   IR    70 50         Treatment:   Yahaira received the treatments listed below:       Therapeutic exercises to develop strength, endurance, ROM, flexibility, and posture for 15 minutes including:    UBE 3'/3'  Supine dowel press to flexion 30x   Hand heel rock backs 30x5"   Banded ER/IR isotonics OTB 2x10      Not " "today:  Table slides - flexion and scaption 5 minutes each   Seated thoracic extensions 2x10   Pt education on procedure, precautions, timeline   Table walk backs 30x     Manual therapy techniques: Joint mobilizations and Soft tissue Mobilization were applied to the: left shoulder for 08 minutes, including:  Oscillations   Passive range of motion within precautions   Light rhythmic stabilizations ER/IR neutral and 90 flexion         Therapeutic activities to improve functional performance for 00 minutes, including:        Neuromuscular re-education activities to improve: Balance, Coordination, Kinesthetic, Sense, Proprioception, and Posture for 22 minutes. The following activities were included:      Prone row 3x10x5" w/ 4#   Prone Is 3x10x5" w/ 4#   Ball on wall ABCs x2   Side lying ER 3x10x5"      Not today:  Standing rhythmic stabilizations YTB 3x10 each at 45- deg flexion   Body blade ER/IR, flexion/extension 3x30" each   Banded ER/IR walkouts GTB 3x10     Time Entry(in minutes):  Manual Therapy Time Entry: 8  Neuromuscular Re-Education Time Entry: 22  Therapeutic Exercise Time Entry: 15    Assessment & Plan   Assessment: Tea is 9 weeks s/p labral repair of left shoulder. Demos improvement in passive and active range of motion which is meeting expectations at this point post-operatively. Expect full range of motion at 12 weeks. Fatigued with current interventions so held progressions. Will continue to gradually progress range of motion, muscular endurance, and neuromuscular control as appropriate.   Evaluation/Treatment Tolerance: Patient tolerated treatment well    The patient will continue to benefit from skilled outpatient physical therapy in order to address the deficits listed in the problem list on the initial evaluation, provide patient and family education, and maximize the patients level of independence in the home and community environments.     The patient's spiritual, cultural, and educational " needs were considered, and the patient is agreeable to the plan of care and goals.           Plan: gradually restore ROM s/p labral repair with remplissage    Goals:   Active       Long Term Goals       Pt to demo LSI >/= 90% of rotator cuff testing to progress through return to sport activities.        Start:  06/02/25    Expected End:  12/02/25            Pt to complete UE functional testing to met age and gender matched norms with appropriate LSI including but not limited to seated med ball throw, unilateral shot put test, posterior shoulder endurance test, and ball drop test.        Start:  06/02/25    Expected End:  12/02/25            Pt to demo unilateral push and push assessment with LSI >/= 100% to demo readiness to return to sport.        Start:  06/02/25    Expected End:  12/02/25            Pt to return to sport/track without limitations.        Start:  06/02/25    Expected End:  12/02/25               Mid Term Goals        Patient will improve their FOTO limitation score to at least predicted score as evidence of clinically significant improvements in their function.       Start:  06/02/25    Expected End:  12/02/25            Pt to demo normalized passive range of motion of left shoulder.        Start:  06/02/25    Expected End:  12/02/25            Pt to demo improvement in AROM of L shoulder within 95% of R shoulder to improve tolerance to OH movement and overall UE function.       Start:  06/02/25    Expected End:  12/02/25            Pt demo at least 4+/5 on muscle testing of scapular stabilizers and rotator cuff musculature to demo improvement in tolerance to activity.       Start:  06/02/25    Expected End:  12/02/25               Short Term Goals        Pt will be compliant with % of prescribed amount.        Start:  06/02/25    Expected End:  12/02/25            The pt to demo improvement in L shoulder PROM to by 80% of the R shoulder.        Start:  06/02/25    Expected End:  12/02/25              The pt to demo tolerance to being out of the sling for 24 hours with pain <2/10       Start:  06/02/25    Expected End:  12/02/25                Heidi Hunt, PT, DPT, SCS

## 2025-07-29 ENCOUNTER — CLINICAL SUPPORT (OUTPATIENT)
Dept: REHABILITATION | Facility: HOSPITAL | Age: 25
End: 2025-07-29
Payer: COMMERCIAL

## 2025-07-29 DIAGNOSIS — M62.81 MUSCLE WEAKNESS OF LEFT UPPER EXTREMITY: ICD-10-CM

## 2025-07-29 DIAGNOSIS — M25.612 DECREASED ROM OF LEFT SHOULDER: Primary | ICD-10-CM

## 2025-07-29 PROCEDURE — 97112 NEUROMUSCULAR REEDUCATION: CPT

## 2025-07-29 PROCEDURE — 97110 THERAPEUTIC EXERCISES: CPT

## 2025-07-29 NOTE — PROGRESS NOTES
"  Outpatient Rehab    Physical Therapy Visit    Patient Name: Yahaira Darling  MRN: [id number]  YOB: 2000  Encounter Date: 7/29/2025    Therapy Diagnosis:   Encounter Diagnoses   Name Primary?    Decreased ROM of left shoulder Yes    Muscle weakness of left upper extremity        Physician: Arsalan Harrison MD    Physician Orders: Eval and Treat  Medical Diagnosis: Left shoulder pain  Surgical Diagnosis: unknown   Surgical Date: 5/15/2025  Days Since Last Surgery: 75    Visit # / Visits Authorized:  8 / 12  Insurance Authorization Period: 6/10/2025 to 9/30/2025  Date of Evaluation: 6/2/2025  Plan of Care Certification: 6/2/2025 to 12/2/2025      PT/PTA:     Number of PTA visits since last PT visit:   Time In: 1310   Time Out: 1400  Total Time (in minutes): 50   Total Billable Time (in minutes): 50    FOTO:  Intake Score (%): 47  Survey Score 2 (%): 52  Survey Score 3 (%): Not applicable for this Episode    Precautions:     DOS: 5/15/2025: 10 weeks, 4 days as of 7/29/2025  Left shoulder labral repair and remplissage      Per MD orders: okay for gentle passive range of motion and active range of motion. No forced passive range of motion or strengthening for 4 weeks.      Subjective   Yahaira reports she was sick last week but did a few exercises for her shoulder. Missed her doctor's appointment yesterday for her shoulder..  Pain reported as 0/10.      Objective        Shoulder Passive Range of Motion within Protcol Limits:    Right Left   Flexion    180 150   ER at 0    95 45   ER at 90  120    NT   IR    70 50      Shoulder Active Range of Motion within Protcol Limits:    Right Left   Flexion    180 144   ER at 0    95 30   ER at 90  120    NT   IR    70 50         Treatment:   Yahaira received the treatments listed below:       Therapeutic exercises to develop strength, endurance, ROM, flexibility, and posture for 15 minutes including:    Supine dowel press to flexion 3x10 w/ 3#   Hand heel rock backs 30x5"   Banded " "ER/IR isotonics OTB 3x10       Not today:  UBE 3'/3'  Table slides - flexion and scaption 5 minutes each   Seated thoracic extensions 2x10   Pt education on procedure, precautions, timeline   Table walk backs 30x     Manual therapy techniques: Joint mobilizations and Soft tissue Mobilization were applied to the: left shoulder for 08 minutes, including:  Oscillations   Passive range of motion within precautions   Light rhythmic stabilizations ER/IR neutral and 90 flexion         Therapeutic activities to improve functional performance for 00 minutes, including:        Neuromuscular re-education activities to improve: Balance, Coordination, Kinesthetic, Sense, Proprioception, and Posture for 27 minutes. The following activities were included:     Ball on wall ABCs x2    Prone row 3x10x5" w/ 4#   Prone Is 3x10x5" w/ 4#   Side lying ER 3x10x5" w/ 1#    Landmine press with dowel 3x15      Not today:  Standing rhythmic stabilizations YTB 3x10 each at 45- deg flexion   Body blade ER/IR, flexion/extension 3x30" each   Banded ER/IR walkouts GTB 3x10     Time Entry(in minutes):  Manual Therapy Time Entry: 8  Neuromuscular Re-Education Time Entry: 27  Therapeutic Exercise Time Entry: 15    Assessment & Plan   Assessment: Tea is 10 weeks s/p labral repair of left shoulder. Demos improvement in passive and active range of motion which is meeting expectations at this point post-operatively but demos significant compensation/shoulder shrug past 90 degrees of elevation. Needs further work in establishing neuromuscular control of scapular stabilizers and rotator cuff within available range. Will continue to progress per protocol.        The patient will continue to benefit from skilled outpatient physical therapy in order to address the deficits listed in the problem list on the initial evaluation, provide patient and family education, and maximize the patients level of independence in the home and community environments. "     The patient's spiritual, cultural, and educational needs were considered, and the patient is agreeable to the plan of care and goals.           Plan: gradually restore ROM s/p labral repair with remplissage    Goals:   Active       Long Term Goals       Pt to demo LSI >/= 90% of rotator cuff testing to progress through return to sport activities.        Start:  06/02/25    Expected End:  12/02/25            Pt to complete UE functional testing to met age and gender matched norms with appropriate LSI including but not limited to seated med ball throw, unilateral shot put test, posterior shoulder endurance test, and ball drop test.        Start:  06/02/25    Expected End:  12/02/25            Pt to demo unilateral push and push assessment with LSI >/= 100% to demo readiness to return to sport.        Start:  06/02/25    Expected End:  12/02/25            Pt to return to sport/track without limitations.        Start:  06/02/25    Expected End:  12/02/25               Mid Term Goals        Patient will improve their FOTO limitation score to at least predicted score as evidence of clinically significant improvements in their function.       Start:  06/02/25    Expected End:  12/02/25            Pt to demo normalized passive range of motion of left shoulder.        Start:  06/02/25    Expected End:  12/02/25            Pt to demo improvement in AROM of L shoulder within 95% of R shoulder to improve tolerance to OH movement and overall UE function.       Start:  06/02/25    Expected End:  12/02/25            Pt demo at least 4+/5 on muscle testing of scapular stabilizers and rotator cuff musculature to demo improvement in tolerance to activity.       Start:  06/02/25    Expected End:  12/02/25               Short Term Goals        Pt will be compliant with % of prescribed amount.        Start:  06/02/25    Expected End:  12/02/25            The pt to demo improvement in L shoulder PROM to by 80% of the R  shoulder.        Start:  06/02/25    Expected End:  12/02/25             The pt to demo tolerance to being out of the sling for 24 hours with pain <2/10       Start:  06/02/25    Expected End:  12/02/25                  Heidi Hunt, PT, DPT, SCS

## 2025-08-06 ENCOUNTER — CLINICAL SUPPORT (OUTPATIENT)
Dept: REHABILITATION | Facility: HOSPITAL | Age: 25
End: 2025-08-06
Payer: COMMERCIAL

## 2025-08-06 DIAGNOSIS — M62.81 MUSCLE WEAKNESS OF LEFT UPPER EXTREMITY: ICD-10-CM

## 2025-08-06 DIAGNOSIS — M25.612 DECREASED ROM OF LEFT SHOULDER: Primary | ICD-10-CM

## 2025-08-06 PROCEDURE — 97110 THERAPEUTIC EXERCISES: CPT

## 2025-08-06 PROCEDURE — 97112 NEUROMUSCULAR REEDUCATION: CPT

## 2025-08-06 NOTE — PROGRESS NOTES
"  Outpatient Rehab    Physical Therapy Visit    Patient Name: Yahaira Darling  MRN: [id number]  YOB: 2000  Encounter Date: 8/6/2025    Therapy Diagnosis:   Encounter Diagnoses   Name Primary?    Decreased ROM of left shoulder Yes    Muscle weakness of left upper extremity        Physician: Arsalan Harrison MD    Physician Orders: Eval and Treat  Medical Diagnosis: Left shoulder pain  Surgical Diagnosis: unknown   Surgical Date: 5/15/2025  Days Since Last Surgery: 83    Visit # / Visits Authorized:  9 / 12  Insurance Authorization Period: 6/10/2025 to 9/30/2025  Date of Evaluation: 6/2/2025  Plan of Care Certification: 6/2/2025 to 12/2/2025      PT/PTA:     Number of PTA visits since last PT visit:   Time In: 1305   Time Out: 1400  Total Time (in minutes): 55   Total Billable Time (in minutes): 55    FOTO:  Intake Score (%): 47  Survey Score 2 (%): 52  Survey Score 3 (%): Not applicable for this Episode    Precautions:     DOS: 5/15/2025: 11 weeks, 5 days as of 8/6/2025  Left shoulder labral repair and remplissage      Per MD orders: okay for gentle passive range of motion and active range of motion. No forced passive range of motion or strengthening for 4 weeks.      Subjective   Yahaira reports her shoulder hurts in the front, thinks it is from sleeping wrong..  Pain reported as 2/10.      Objective        Shoulder Passive Range of Motion within Protcol Limits:    Right Left   Flexion    180 150   ER at 0    95 45   ER at 90  120    NT   IR    70 50      Shoulder Active Range of Motion within Protcol Limits:    Right Left   Flexion    180 144   ER at 0    95 30   ER at 90  120    NT   IR    70 50         Treatment:   Yahaira received the treatments listed below:       Therapeutic exercises to develop strength, endurance, ROM, flexibility, and posture for 08 minutes including:    Supine dowel press to flexion 3x10 w/ 3#   Hand heel rock backs 30x5"     Not today:  UBE 3'/3'  Banded ER/IR isotonics OTB 3x10    Table " "slides - flexion and scaption 5 minutes each   Seated thoracic extensions 2x10   Pt education on procedure, precautions, timeline   Table walk backs 30x     Manual therapy techniques: Joint mobilizations and Soft tissue Mobilization were applied to the: left shoulder for 08 minutes, including:  Oscillations   Passive range of motion within precautions   Light rhythmic stabilizations ER/IR neutral and 90 flexion with centering glide         Therapeutic activities to improve functional performance for 00 minutes, including:        Neuromuscular re-education activities to improve: Balance, Coordination, Kinesthetic, Sense, Proprioception, and Posture for 39 minutes. The following activities were included:   Supine rhythmic stabilizations YTB 3x10 each at 45- deg flexion   Prone row 3x15x5" w/ 4#   Prone Is 3x15x5" w/ 4#   Side lying ER 3x10x5" w/ 1#    Landmine press with dowel 3x15   - wall slides 2x10 but d/c due to pain   Banded ER/IR walkouts GTB 3x10        Not today:  Ball on wall ABCs x2    Body blade ER/IR, flexion/extension 3x30" each       Time Entry(in minutes):  Manual Therapy Time Entry: 8  Neuromuscular Re-Education Time Entry: 39  Therapeutic Exercise Time Entry: 8    Assessment & Plan   Assessment: Tea is 12 weeks s/p labral repair of left shoulder. Maintains range of motion from previous session. Quality of active flexion improved after scapular and rotator cuff activation exercises. Onset of anterior shoulder pain most likely due to weakness of rotator cuff that provides dynamic shoulder stability. Pain decreased after performing cuff work. Needs further work in establishing neuromuscular control of scapular stabilizers and rotator cuff within available range. Will continue to progress per protocol.   Evaluation/Treatment Tolerance: Patient tolerated treatment well    The patient will continue to benefit from skilled outpatient physical therapy in order to address the deficits listed in the " problem list on the initial evaluation, provide patient and family education, and maximize the patients level of independence in the home and community environments.     The patient's spiritual, cultural, and educational needs were considered, and the patient is agreeable to the plan of care and goals.           Plan: gradually restore ROM s/p labral repair with remplissage    Goals:   Active       Long Term Goals       Pt to demo LSI >/= 90% of rotator cuff testing to progress through return to sport activities.        Start:  06/02/25    Expected End:  12/02/25            Pt to complete UE functional testing to met age and gender matched norms with appropriate LSI including but not limited to seated med ball throw, unilateral shot put test, posterior shoulder endurance test, and ball drop test.        Start:  06/02/25    Expected End:  12/02/25            Pt to demo unilateral push and push assessment with LSI >/= 100% to demo readiness to return to sport.        Start:  06/02/25    Expected End:  12/02/25            Pt to return to sport/track without limitations.        Start:  06/02/25    Expected End:  12/02/25               Mid Term Goals        Patient will improve their FOTO limitation score to at least predicted score as evidence of clinically significant improvements in their function.       Start:  06/02/25    Expected End:  12/02/25            Pt to demo normalized passive range of motion of left shoulder.        Start:  06/02/25    Expected End:  12/02/25            Pt to demo improvement in AROM of L shoulder within 95% of R shoulder to improve tolerance to OH movement and overall UE function.       Start:  06/02/25    Expected End:  12/02/25            Pt demo at least 4+/5 on muscle testing of scapular stabilizers and rotator cuff musculature to demo improvement in tolerance to activity.       Start:  06/02/25    Expected End:  12/02/25               Short Term Goals        Pt will be compliant  with % of prescribed amount.        Start:  06/02/25    Expected End:  12/02/25            The pt to demo improvement in L shoulder PROM to by 80% of the R shoulder.        Start:  06/02/25    Expected End:  12/02/25             The pt to demo tolerance to being out of the sling for 24 hours with pain <2/10       Start:  06/02/25    Expected End:  12/02/25                  Heidi Hunt, PT, DPT, SCS

## 2025-08-12 ENCOUNTER — CLINICAL SUPPORT (OUTPATIENT)
Dept: REHABILITATION | Facility: HOSPITAL | Age: 25
End: 2025-08-12
Payer: COMMERCIAL

## 2025-08-12 DIAGNOSIS — M25.612 DECREASED ROM OF LEFT SHOULDER: Primary | ICD-10-CM

## 2025-08-12 DIAGNOSIS — M62.81 MUSCLE WEAKNESS OF LEFT UPPER EXTREMITY: ICD-10-CM

## 2025-08-12 PROCEDURE — 97110 THERAPEUTIC EXERCISES: CPT

## 2025-08-12 PROCEDURE — 97112 NEUROMUSCULAR REEDUCATION: CPT

## 2025-08-19 ENCOUNTER — CLINICAL SUPPORT (OUTPATIENT)
Dept: REHABILITATION | Facility: HOSPITAL | Age: 25
End: 2025-08-19
Payer: COMMERCIAL

## 2025-08-19 DIAGNOSIS — M62.81 MUSCLE WEAKNESS OF LEFT UPPER EXTREMITY: ICD-10-CM

## 2025-08-19 DIAGNOSIS — M25.612 DECREASED ROM OF LEFT SHOULDER: Primary | ICD-10-CM

## 2025-08-19 PROCEDURE — 97112 NEUROMUSCULAR REEDUCATION: CPT

## 2025-08-19 PROCEDURE — 97140 MANUAL THERAPY 1/> REGIONS: CPT

## 2025-08-19 PROCEDURE — 97110 THERAPEUTIC EXERCISES: CPT

## 2025-08-21 ENCOUNTER — CLINICAL SUPPORT (OUTPATIENT)
Dept: REHABILITATION | Facility: HOSPITAL | Age: 25
End: 2025-08-21
Payer: COMMERCIAL

## 2025-08-21 DIAGNOSIS — M25.612 DECREASED ROM OF LEFT SHOULDER: Primary | ICD-10-CM

## 2025-08-21 DIAGNOSIS — M62.81 MUSCLE WEAKNESS OF LEFT UPPER EXTREMITY: ICD-10-CM

## 2025-08-21 PROCEDURE — 97112 NEUROMUSCULAR REEDUCATION: CPT

## 2025-08-21 PROCEDURE — 97110 THERAPEUTIC EXERCISES: CPT
